# Patient Record
Sex: MALE | Race: WHITE | NOT HISPANIC OR LATINO | Employment: UNEMPLOYED | URBAN - METROPOLITAN AREA
[De-identification: names, ages, dates, MRNs, and addresses within clinical notes are randomized per-mention and may not be internally consistent; named-entity substitution may affect disease eponyms.]

---

## 2019-07-23 ENCOUNTER — OFFICE VISIT (OUTPATIENT)
Dept: URGENT CARE | Facility: PHYSICIAN GROUP | Age: 39
End: 2019-07-23

## 2019-07-23 VITALS
SYSTOLIC BLOOD PRESSURE: 112 MMHG | WEIGHT: 220 LBS | OXYGEN SATURATION: 98 % | TEMPERATURE: 98.2 F | BODY MASS INDEX: 29.8 KG/M2 | HEART RATE: 62 BPM | HEIGHT: 72 IN | DIASTOLIC BLOOD PRESSURE: 78 MMHG | RESPIRATION RATE: 16 BRPM

## 2019-07-23 DIAGNOSIS — G47.00 INSOMNIA, UNSPECIFIED TYPE: ICD-10-CM

## 2019-07-23 DIAGNOSIS — K64.4 EXTERNAL HEMORRHOID: Primary | ICD-10-CM

## 2019-07-23 PROCEDURE — 99204 OFFICE O/P NEW MOD 45 MIN: CPT | Performed by: PHYSICIAN ASSISTANT

## 2019-07-23 NOTE — PROGRESS NOTES
Subjective:      Pt is a 38 y.o. male who presents with Rectal Bleeding (x 3 months )            HPI  This is a new problem. Pt notes bright red blood on stools x 3 months without pain or other symptoms. Pt also notes insomnia issues x 1 month now and wishes to see a psychiatrist to discover triggers and notes marijuana and alcohol help him sleep. Pt has not taken any Rx medications for this condition. Pt states the pain is a 0/10. Pt denies CP, SOB, NVD, paresthesias, headaches, dizziness, change in vision, hives, or other joint pain. The pt's medication list, problem list, and allergies have been evaluated and reviewed during today's visit.    PMH:  Negative per pt.      PSH:  Negative per pt.      Fam Hx:  the patient's family history is not pertinent to their current complaint      Soc HX:  Social History     Social History   • Marital status:      Spouse name: N/A   • Number of children: N/A   • Years of education: N/A     Occupational History   • Not on file.     Social History Main Topics   • Smoking status: Current Every Day Smoker   • Smokeless tobacco: Never Used      Comment: vape   • Alcohol use 2.4 oz/week     4 Cans of beer per week      Comment: 4 nightly    • Drug use: Yes     Types: Marijuana   • Sexual activity: Not on file     Other Topics Concern   • Not on file     Social History Narrative   • No narrative on file         Medications:    Current Outpatient Prescriptions:   •  Omeprazole (PRILOSEC PO), Take  by mouth., Disp: , Rfl:   •  Lidocaine-Hydrocortisone Ace 3-0.5 % Cream, Insert 1 Application in rectum 2 Times a Day., Disp: 1 Tube, Rfl: 3      Allergies:  Patient has no known allergies.    ROS  Constitutional: Negative for fever, chills and malaise/fatigue.   HENT: Negative for congestion and sore throat.    Eyes: Negative for blurred vision, double vision and photophobia.   Respiratory: Negative for cough and shortness of breath.  Cardiovascular: Negative for chest pain and  palpitations.   Gastrointestinal: Negative for heartburn, nausea, vomiting, abdominal pain, diarrhea and constipation.   Genitourinary: Negative for dysuria and flank pain. +blood in stools  Musculoskeletal: Negative for joint pain and myalgias.   Skin: Negative for itching and rash.   Neurological: Negative for dizziness, tingling and headaches.   Endo/Heme/Allergies: Does not bruise/bleed easily.   Psychiatric/Behavioral: +insomnia         Objective:     /78 (BP Location: Left arm, Patient Position: Sitting, BP Cuff Size: Adult)   Pulse 62   Temp 36.8 °C (98.2 °F) (Temporal)   Resp 16   Ht 1.829 m (6')   Wt 99.8 kg (220 lb)   SpO2 98%   BMI 29.84 kg/m²      Physical Exam   Genitourinary: Rectal exam shows external hemorrhoid, tenderness and guaiac positive stool. Rectal exam shows no internal hemorrhoid, no fissure, no mass and anal tone normal.                Constitutional: PT is oriented to person, place, and time. PT appears well-developed and well-nourished. No distress.   HENT:   Head: Normocephalic and atraumatic.   Mouth/Throat: Oropharynx is clear and moist. No oropharyngeal exudate.   Eyes: Conjunctivae normal and EOM are normal. Pupils are equal, round, and reactive to light.   Neck: Normal range of motion. Neck supple. No thyromegaly present.   Cardiovascular: Normal rate, regular rhythm, normal heart sounds and intact distal pulses.  Exam reveals no gallop and no friction rub.    No murmur heard.  Pulmonary/Chest: Effort normal and breath sounds normal. No respiratory distress. PT has no wheezes. PT has no rales. Pt exhibits no tenderness.   Abdominal: Soft. Bowel sounds are normal. PT exhibits no distension and no mass. There is no tenderness. There is no rebound and no guarding.   Musculoskeletal: Normal range of motion. PT exhibits no edema and no tenderness.   Neurological: PT is alert and oriented to person, place, and time. PT has normal reflexes. No cranial nerve deficit.   Skin:  Skin is warm and dry. No rash noted. PT is not diaphoretic. No erythema.       Psychiatric: PT has a normal mood and affect. PT behavior is normal. Judgment and thought content normal.        Assessment/Plan:     1. External hemorrhoid    - Lidocaine-Hydrocortisone Ace 3-0.5 % Cream; Insert 1 Application in rectum 2 Times a Day.  Dispense: 1 Tube; Refill: 3  - REFERRAL TO GASTROENTEROLOGY    2. Insomnia, unspecified type    - REFERRAL TO PSYCHIATRY    STRICT ER precautions given  Rest, fluids encouraged.  Optifiber or Benefiber encouraged OTC  AVS with medical info given.  Pt was in full understanding and agreement with the plan.  Differential diagnosis, natural history, supportive care, and indications for immediate follow-up discussed. All questions answered. Patient agrees with the plan of care.  Follow-up as needed if symptoms worsen or fail to improve.

## 2020-01-13 ENCOUNTER — OFFICE VISIT (OUTPATIENT)
Dept: URGENT CARE | Facility: PHYSICIAN GROUP | Age: 40
End: 2020-01-13

## 2020-01-13 ENCOUNTER — HOSPITAL ENCOUNTER (OUTPATIENT)
Dept: RADIOLOGY | Facility: MEDICAL CENTER | Age: 40
End: 2020-01-13
Attending: NURSE PRACTITIONER

## 2020-01-13 VITALS
HEART RATE: 89 BPM | TEMPERATURE: 97 F | BODY MASS INDEX: 33.32 KG/M2 | RESPIRATION RATE: 18 BRPM | DIASTOLIC BLOOD PRESSURE: 74 MMHG | WEIGHT: 238 LBS | HEIGHT: 71 IN | SYSTOLIC BLOOD PRESSURE: 104 MMHG | OXYGEN SATURATION: 98 %

## 2020-01-13 DIAGNOSIS — N45.1 EPIDIDYMITIS: ICD-10-CM

## 2020-01-13 DIAGNOSIS — N50.811 TESTICULAR PAIN, RIGHT: ICD-10-CM

## 2020-01-13 PROCEDURE — 99214 OFFICE O/P EST MOD 30 MIN: CPT | Performed by: NURSE PRACTITIONER

## 2020-01-13 PROCEDURE — 76870 US EXAM SCROTUM: CPT

## 2020-01-13 RX ORDER — IBUPROFEN 200 MG
200 TABLET ORAL EVERY 6 HOURS PRN
COMMUNITY

## 2020-01-13 RX ORDER — SULFAMETHOXAZOLE AND TRIMETHOPRIM 800; 160 MG/1; MG/1
1 TABLET ORAL 2 TIMES DAILY
Qty: 20 TAB | Refills: 0 | Status: SHIPPED | OUTPATIENT
Start: 2020-01-13 | End: 2020-01-23

## 2020-01-13 SDOH — HEALTH STABILITY: MENTAL HEALTH: HOW OFTEN DO YOU HAVE A DRINK CONTAINING ALCOHOL?: 2-3 TIMES A WEEK

## 2020-01-13 ASSESSMENT — ENCOUNTER SYMPTOMS
AFFECTED TESTICLE: 1
FEVER: 0
FLANK PAIN: 0
CHILLS: 0

## 2020-01-13 NOTE — PROGRESS NOTES
Subjective:      Landry Vernon is a 39 y.o. male who presents with Testicle Pain ([id number] )    History reviewed. No pertinent past medical history.  Social History     Socioeconomic History   • Marital status:      Spouse name: Not on file   • Number of children: Not on file   • Years of education: Not on file   • Highest education level: Not on file   Occupational History   • Not on file   Social Needs   • Financial resource strain: Not on file   • Food insecurity:     Worry: Not on file     Inability: Not on file   • Transportation needs:     Medical: Not on file     Non-medical: Not on file   Tobacco Use   • Smoking status: Former Smoker     Packs/day: 0.00   • Smokeless tobacco: Never Used   • Tobacco comment: vape   Substance and Sexual Activity   • Alcohol use: Yes     Alcohol/week: 2.4 oz     Types: 4 Cans of beer per week     Frequency: 2-3 times a week     Comment: 4 nightly    • Drug use: Yes     Types: Marijuana   • Sexual activity: Not on file   Lifestyle   • Physical activity:     Days per week: Not on file     Minutes per session: Not on file   • Stress: Not on file   Relationships   • Social connections:     Talks on phone: Not on file     Gets together: Not on file     Attends Yazdanism service: Not on file     Active member of club or organization: Not on file     Attends meetings of clubs or organizations: Not on file     Relationship status: Not on file   • Intimate partner violence:     Fear of current or ex partner: Not on file     Emotionally abused: Not on file     Physically abused: Not on file     Forced sexual activity: Not on file   Other Topics Concern   • Not on file   Social History Narrative   • Not on file     No family history on file.    Allergies: Patient has no known allergies.    Patient is a 39-year-old male who presents today with complaint of acute onset of right testicular pain over the last 48 hours.  No injury.  States he got up very early 2 mornings ago  "to go to the bathroom and upon returning to bed noticed that he was having testicular pain on the right side.  Pain waxed and waned through the weekend though never completely resolved.  He did take ibuprofen with mild to moderate relief of symptoms.  States this morning he woke up and the pain was much worse.  He did have nausea this morning but no vomiting.  Denies fever.  Denies urgency frequency or dysuria.  No urethral discharge.  No lower back pain.          Testicle Pain   This is a new problem. The current episode started in the past 7 days. The problem occurs constantly. The problem has been unchanged. Pertinent negatives include no chills or fever. Nothing aggravates the symptoms. He has tried nothing for the symptoms. The treatment provided no relief.       Review of Systems   Constitutional: Negative for chills and fever.   Genitourinary: Negative for dysuria, flank pain, frequency, hematuria and urgency.        Right testicular pain   All other systems reviewed and are negative.         Objective:     /74   Pulse 89   Temp 36.1 °C (97 °F) (Temporal)   Resp 18   Ht 1.803 m (5' 11\")   Wt 108 kg (238 lb)   SpO2 98%   BMI 33.19 kg/m²      Physical Exam  Vitals signs reviewed.   Constitutional:       Appearance: He is not ill-appearing, toxic-appearing or diaphoretic.   Abdominal:      Tenderness: There is no tenderness. There is no right CVA tenderness, left CVA tenderness, guarding or rebound.   Genitourinary:     Penis: Normal.       Comments: Generalized pain, mild inflammation noted over the right testicle.  No swelling or pain in the suprapubic area or groin.  No CVA tenderness.  No urethral discharge.  Neurological:      Mental Status: He is alert.       US scrotum/contents: Inflammatory changes in the right testicle noted consistent with epididymitis.          Assessment/Plan:       1. Testicular pain, right  2.  Epididymitis    - [id number]; Future  -Bactrim DS  -Strict ER " precautions for persistent or worsening of symptoms

## 2021-01-08 ENCOUNTER — OFFICE VISIT (OUTPATIENT)
Dept: URGENT CARE | Facility: PHYSICIAN GROUP | Age: 41
End: 2021-01-08

## 2021-01-08 VITALS
HEIGHT: 71 IN | TEMPERATURE: 97.8 F | OXYGEN SATURATION: 98 % | BODY MASS INDEX: 33.6 KG/M2 | DIASTOLIC BLOOD PRESSURE: 90 MMHG | WEIGHT: 240 LBS | RESPIRATION RATE: 16 BRPM | HEART RATE: 103 BPM | SYSTOLIC BLOOD PRESSURE: 118 MMHG

## 2021-01-08 DIAGNOSIS — F41.9 ANXIETY: ICD-10-CM

## 2021-01-08 DIAGNOSIS — F10.10 ALCOHOL ABUSE: ICD-10-CM

## 2021-01-08 DIAGNOSIS — K29.00 ACUTE GASTRITIS WITHOUT HEMORRHAGE, UNSPECIFIED GASTRITIS TYPE: ICD-10-CM

## 2021-01-08 PROCEDURE — 99214 OFFICE O/P EST MOD 30 MIN: CPT | Performed by: PHYSICIAN ASSISTANT

## 2021-01-08 RX ORDER — HYDROXYZINE PAMOATE 25 MG/1
50 CAPSULE ORAL 2 TIMES DAILY PRN
Qty: 30 CAP | Refills: 0 | Status: SHIPPED | OUTPATIENT
Start: 2021-01-08

## 2021-01-08 ASSESSMENT — ENCOUNTER SYMPTOMS
NAUSEA: 1
HEARTBURN: 1
WHEEZING: 0
ABDOMINAL PAIN: 1
INSOMNIA: 1
PALPITATIONS: 0
HEADACHES: 0
VOMITING: 0
NERVOUS/ANXIOUS: 1
SHORTNESS OF BREATH: 0
DIZZINESS: 0
DIARRHEA: 0

## 2021-01-09 NOTE — PROGRESS NOTES
"Subjective:      Landry Vernon is a 40 y.o. male who presents with Anxiety (stomach feels tight, x3 days)            Patient is a 40-year-old male who presents to urgent care with worsening anxiety over the last 2 weeks.  Patient is with his wife today who also provides history.  Patient reports 2 weeks ago his mother  unexpectedly.  He always has had anxiety of which has typically been controlled with supplements i.e. Damaris's wort.  He admits that he has had some control of his anxiety until 2 weeks ago when it profoundly worsened.  He is requesting possible counseling and medication today.  He does report that he has been drinking daily for several years or so the last 2 weeks upwards of \"8 tall cans \"1 beer daily.  He does feel \"panicky\", decrease sleep, and restless the majority of the day.  He does report over the last 3 days upon awakening in the morning he feels as though his stomach is \"turning \"or squeezing and will last for a few seconds and relax.  Once this relaxes he feels \"like crap \"almost as if he will vomit.  He denies any vomiting, diarrhea.  He has not had an episode since a nap earlier today and \"feels fine \"at this time.  Currently denies any pain.  He denies any shortness of breath or chest pain.    Anxiety  Presents for initial visit. Onset was 1 to 5 years ago. The problem has been gradually worsening. Symptoms include insomnia, nausea and nervous/anxious behavior. Patient reports no chest pain, dizziness, palpitations or shortness of breath.     Risk factors include alcohol intake.       Review of Systems   Respiratory: Negative for shortness of breath and wheezing.    Cardiovascular: Negative for chest pain and palpitations.   Gastrointestinal: Positive for abdominal pain, heartburn and nausea. Negative for diarrhea and vomiting.   Neurological: Negative for dizziness and headaches.   Psychiatric/Behavioral: The patient is nervous/anxious and has insomnia.    All other systems " "reviewed and are negative.         Objective:     /90   Pulse (!) 103   Temp 36.6 °C (97.8 °F) (Temporal)   Resp 16   Ht 1.803 m (5' 11\")   Wt 108.9 kg (240 lb)   SpO2 98%   BMI 33.47 kg/m²      Physical Exam  Vitals signs reviewed.   Constitutional:       General: He is not in acute distress.     Appearance: He is well-developed.   HENT:      Head: Normocephalic and atraumatic.      Right Ear: External ear normal.      Left Ear: External ear normal.      Mouth/Throat:      Pharynx: No oropharyngeal exudate.   Eyes:      Conjunctiva/sclera: Conjunctivae normal.      Pupils: Pupils are equal, round, and reactive to light.   Neck:      Musculoskeletal: Normal range of motion and neck supple.      Trachea: No tracheal deviation.   Cardiovascular:      Rate and Rhythm: Normal rate and regular rhythm.      Heart sounds: No murmur.   Pulmonary:      Effort: Pulmonary effort is normal. No respiratory distress.      Breath sounds: Normal breath sounds.   Abdominal:      General: Bowel sounds are normal.      Palpations: Abdomen is soft.      Tenderness: There is no abdominal tenderness.   Musculoskeletal: Normal range of motion.   Skin:     General: Skin is warm.      Findings: No rash.   Neurological:      Mental Status: He is alert and oriented to person, place, and time.      Coordination: Coordination normal.   Psychiatric:         Behavior: Behavior normal.         Thought Content: Thought content normal.         Judgment: Judgment normal.                 Assessment/Plan:        1. Anxiety  - REFERRAL TO BEHAVIORAL HEALTH    2. Acute gastritis without hemorrhage, unspecified gastritis type  - REFERRAL TO BEHAVIORAL HEALTH    3. Alcohol abuse    Patient with completely benign exam today.  Abdomen is nontender unable to elicit the discomfort.  Discussed complications of continued daily drinking.  Patient does have history of gastritis with reflux currently taking omeprazole of which noted important to change " his diet, decrease sodium due to somewhat elevated diastolic blood pressure.  We will start the patient on hydroxyzine and will make a behavioral health referral.  Very strict precautions were discussed that would indicate pancreatitis, ischemic bowel, gallstones etc.  Patient currently stable without SI or HI.  He is waiting for his insurance to start to begin the process of getting a primary care provider.  Patient and/or guardian given precautionary s/sx that mandate immediate follow up and evaluation in the ED. Advised of risks of not doing so.  Side effects of OTC or prescribed medications discussed.   DDX, Supportive care, and indications for immediate follow-up discussed with patient.    Instructed to return to clinic or nearest emergency department if we are not available for any change in condition, further concerns, or worsening of symptoms.    The patient and/or guardian demonstrated a good understanding and agreed with the treatment plan.    Please note that this dictation was created using voice recognition software. I have made every reasonable attempt to correct obvious errors, but I expect that there are errors of grammar and possibly content that I did not discover before finalizing the note.

## 2021-08-11 ENCOUNTER — OFFICE VISIT (OUTPATIENT)
Dept: URGENT CARE | Facility: PHYSICIAN GROUP | Age: 41
End: 2021-08-11

## 2021-08-11 VITALS
HEART RATE: 77 BPM | OXYGEN SATURATION: 95 % | SYSTOLIC BLOOD PRESSURE: 122 MMHG | WEIGHT: 242 LBS | BODY MASS INDEX: 32.78 KG/M2 | TEMPERATURE: 97.9 F | RESPIRATION RATE: 16 BRPM | HEIGHT: 72 IN | DIASTOLIC BLOOD PRESSURE: 76 MMHG

## 2021-08-11 DIAGNOSIS — R10.13 EPIGASTRIC PAIN: ICD-10-CM

## 2021-08-11 PROCEDURE — 99214 OFFICE O/P EST MOD 30 MIN: CPT | Performed by: PHYSICIAN ASSISTANT

## 2021-08-11 RX ORDER — OMEPRAZOLE 20 MG/1
20 CAPSULE, DELAYED RELEASE ORAL
COMMUNITY

## 2021-08-11 RX ORDER — SUCRALFATE 1 G/1
1 TABLET ORAL
Qty: 120 TABLET | Refills: 3 | Status: SHIPPED | OUTPATIENT
Start: 2021-08-11

## 2021-08-11 ASSESSMENT — ENCOUNTER SYMPTOMS
HEARTBURN: 1
SHORTNESS OF BREATH: 0
COUGH: 0
MYALGIAS: 0
ABDOMINAL PAIN: 1
CONSTIPATION: 0
BLOOD IN STOOL: 0
NAUSEA: 0
HEADACHES: 0
FEVER: 0
DIARRHEA: 0
BACK PAIN: 0
WEAKNESS: 0
FLANK PAIN: 0
VOMITING: 0
CHILLS: 0
PALPITATIONS: 0
DIZZINESS: 0
DIAPHORESIS: 0
WEIGHT LOSS: 0
ROS GI COMMENTS: SOFT STOOL

## 2021-08-11 NOTE — PROGRESS NOTES
Subjective:   Landry Vernon is a 40 y.o. male who presents for GI Problem (x3 day, pain in the upper middle quadrant of stomach, soft stool )      HPI:  This is a very pleasant 40-year-old male presenting to the clinic with abdominal pain x3 days.  The pain is located in his epigastric region.  Described as a constant dull ache.  At rest the pain is a 3/10.  With certain movements such as lumbar rotation or bouncing movements such as ascending and descending stairs his pain greatly increases.  The pain does not radiate.  Pain does not seem to be aggravated after meals.  No associated shortness of breath or chest pain.  He has had soft stools over the last 3 days but denies any blood or mucus in the stool.  Denies any black tarry stool.  Does not feel nauseous has not had any episodes of vomiting.  He does have a history of GERD and gastritis.  Takes omeprazole daily.  He does drink daily states he drinks 1-2 beers per night.  No new medications.  Has not been taking increased amount of anti-inflammatories.  Denies any fevers, chills or myalgias.    Review of Systems   Constitutional: Negative for chills, diaphoresis, fever, malaise/fatigue and weight loss.   Respiratory: Negative for cough and shortness of breath.    Cardiovascular: Negative for chest pain and palpitations.   Gastrointestinal: Positive for abdominal pain and heartburn. Negative for blood in stool, constipation, diarrhea, melena, nausea and vomiting.        Soft stool   Genitourinary: Negative for dysuria, flank pain, frequency, hematuria and urgency.   Musculoskeletal: Negative for back pain and myalgias.   Skin: Negative for rash.   Neurological: Negative for dizziness, weakness and headaches.       Medications:    • hydrOXYzine pamoate Caps  • ibuprofen Tabs  • omeprazole  • PRILOSEC PO    Allergies: Patient has no known allergies.    Problem List: Landry Vernon does not have a problem list on file.    Surgical History:  No past surgical  "history on file.    Past Social Hx: Landry Vernon  reports that he has quit smoking. He smoked 0.00 packs per day. He has never used smokeless tobacco. He reports current alcohol use of about 2.4 oz of alcohol per week. He reports current drug use. Drug: Marijuana.     Past Family Hx:  Landry Vernon family history is not on file.     Problem list, medications, and allergies reviewed by myself today in Epic.     Objective:     /76   Pulse 77   Temp 36.6 °C (97.9 °F) (Temporal)   Resp 16   Ht 1.816 m (5' 11.5\")   Wt 110 kg (242 lb)   SpO2 95%   BMI 33.28 kg/m²     Physical Exam  Constitutional:       General: He is not in acute distress.     Appearance: Normal appearance. He is not ill-appearing, toxic-appearing or diaphoretic.   HENT:      Head: Normocephalic and atraumatic.   Eyes:      Conjunctiva/sclera: Conjunctivae normal.   Cardiovascular:      Rate and Rhythm: Normal rate and regular rhythm.      Pulses: Normal pulses.      Heart sounds: Normal heart sounds.   Pulmonary:      Effort: Pulmonary effort is normal.      Breath sounds: Normal breath sounds. No wheezing.   Abdominal:      General: Bowel sounds are normal. There is no distension.      Palpations: Abdomen is soft. There is no mass.      Tenderness: There is abdominal tenderness in the right upper quadrant, epigastric area and left upper quadrant. There is no right CVA tenderness, left CVA tenderness, guarding or rebound.      Hernia: No hernia is present.          Comments: Patient has mild tenderness to deep palpation to the right upper quadrant, epigastric and left upper quadrant region.  Negative Zhao sign on exam.  Pain is also reproduced with lumbar rotation.   Musculoskeletal:      Cervical back: Normal range of motion. No muscular tenderness.   Lymphadenopathy:      Cervical: No cervical adenopathy.   Skin:     General: Skin is warm and dry.      Capillary Refill: Capillary refill takes less than 2 seconds. "   Neurological:      General: No focal deficit present.      Mental Status: He is alert and oriented to person, place, and time. Mental status is at baseline.   Psychiatric:         Mood and Affect: Mood normal.         Thought Content: Thought content normal.           Assessment/Plan:     Comments/MDM:     • Differential diagnoses and treatment options were discussed with the patient at length today.  Differentials at this time include gastritis versus PUD versus pancreatitis versus cholelithiasis versus abdominal muscle strain.  Patient had very minimal tenderness to palpation to the epigastric region on exam.  Pain was most obvious after lumbar rotation and lumbar flexion.  Negative Zhao sign.  Vital signs stable and within normal limits.  Tolerating oral intake without complication.  We did perform a GI cocktail in clinic which provided moderate improvement.  Discussed potentially obtaining labs and outpatient imaging.  The patient is self-pay and would like to limit expenses at this time.  Due to his symptoms and physical exam findings I believe this is reasonable.  Strict ER precautions were provided for any worsening symptoms.  Diet recommendations discussed.  Limit alcohol intake.  Continue to take omeprazole as prescribed.  Call with questions or concerns.     Diagnosis and associated orders:     1. Epigastric pain    - sucralfate (CARAFATE) 1 GM Tab; Take 1 Tablet by mouth 4 Times a Day,Before Meals and at Bedtime.  Dispense: 120 Tablet; Refill: 3           Differential diagnosis, natural history, supportive care, and indications for immediate follow-up discussed.    Advised the patient to follow-up with the primary care physician for recheck, reevaluation, and consideration of further management.    Please note that this dictation was created using voice recognition software. I have made reasonable attempt to correct obvious errors, but I expect that there are errors of grammar and possibly content  that I did not discover before finalizing the note.    This note was electronically signed by CHANDRAKANT Proctor PA-C

## 2021-09-15 ENCOUNTER — HOSPITAL ENCOUNTER (OUTPATIENT)
Dept: RADIOLOGY | Facility: MEDICAL CENTER | Age: 41
End: 2021-09-15
Attending: PHYSICIAN ASSISTANT

## 2021-09-15 ENCOUNTER — OFFICE VISIT (OUTPATIENT)
Dept: URGENT CARE | Facility: PHYSICIAN GROUP | Age: 41
End: 2021-09-15

## 2021-09-15 VITALS
OXYGEN SATURATION: 98 % | WEIGHT: 246 LBS | SYSTOLIC BLOOD PRESSURE: 130 MMHG | TEMPERATURE: 97.7 F | RESPIRATION RATE: 16 BRPM | DIASTOLIC BLOOD PRESSURE: 82 MMHG | HEIGHT: 72 IN | BODY MASS INDEX: 33.32 KG/M2 | HEART RATE: 61 BPM

## 2021-09-15 DIAGNOSIS — S86.912A KNEE STRAIN, LEFT, INITIAL ENCOUNTER: ICD-10-CM

## 2021-09-15 PROCEDURE — 73564 X-RAY EXAM KNEE 4 OR MORE: CPT | Mod: LT

## 2021-09-15 PROCEDURE — 99214 OFFICE O/P EST MOD 30 MIN: CPT | Performed by: PHYSICIAN ASSISTANT

## 2021-09-15 RX ORDER — NAPROXEN 500 MG/1
500 TABLET ORAL 2 TIMES DAILY WITH MEALS
Qty: 60 TABLET | Refills: 1 | Status: SHIPPED | OUTPATIENT
Start: 2021-09-15

## 2021-09-15 ASSESSMENT — PAIN SCALES - GENERAL: PAINLEVEL: 10=SEVERE PAIN

## 2021-09-15 ASSESSMENT — ENCOUNTER SYMPTOMS
VOMITING: 0
CHILLS: 0
TINGLING: 0
FOCAL WEAKNESS: 0
NAUSEA: 0
FEVER: 0
SENSORY CHANGE: 0

## 2021-09-16 ENCOUNTER — TELEPHONE (OUTPATIENT)
Dept: URGENT CARE | Facility: PHYSICIAN GROUP | Age: 41
End: 2021-09-16

## 2021-09-16 NOTE — PROGRESS NOTES
"Subjective:   Landry Vernon  is a 40 y.o. male who presents for Knee Pain (left, constant pain, no injury, walking and laying down makes it worst, x5 days)      Knee Pain  This is a new problem. The current episode started in the past 7 days (5d). Pertinent negatives include no chills, fever, nausea, rash or vomiting.   Patient presents urgent care complaining of pain to left knee times last 5 days.  He notes some history of similar pain on inner side of left knee for months but much worse over the last 5 days.  He did complains of sharp pain and slight catching sensation.  He denies swelling.  He notes some instability.  He denies memorable traumatic injury.  He states he does work as a  which involves coming up and down off his machine frequently.  He denies history of surgery or significant injury to left knee.  He has tried anti-inflammatory medicine as well as taping with minimal but some improvement.  He has tried a neoprene brace with minimal improvement.  He complains of difficulty finding position of comfort at night while sleeping.    Review of Systems   Constitutional: Negative for chills and fever.   Gastrointestinal: Negative for nausea and vomiting.   Musculoskeletal: Positive for joint pain ( left knee).   Skin: Negative for rash.   Neurological: Negative for tingling, sensory change and focal weakness.       No Known Allergies     Objective:   /82   Pulse 61   Temp 36.5 °C (97.7 °F) (Temporal)   Resp 16   Ht 1.816 m (5' 11.5\")   Wt 112 kg (246 lb)   SpO2 98%   BMI 33.83 kg/m²     Physical Exam  Vitals and nursing note reviewed.   Constitutional:       General: He is not in acute distress.     Appearance: He is well-developed. He is not diaphoretic.   HENT:      Head: Normocephalic and atraumatic.      Right Ear: External ear normal.      Left Ear: External ear normal.      Nose: Nose normal.   Eyes:      General: No scleral icterus.        Right eye: No discharge.   "       Left eye: No discharge.      Conjunctiva/sclera: Conjunctivae normal.   Pulmonary:      Effort: Pulmonary effort is normal. No respiratory distress.   Musculoskeletal:      Cervical back: Neck supple.      Right knee: Normal pulse.      Left knee: Effusion ( trace to mild) present. No swelling, deformity, erythema, ecchymosis, lacerations or crepitus. Decreased range of motion ( pain w/ flexion and extension). Tenderness present over the medial joint line. No lateral joint line or patellar tendon tenderness. No LCL laxity, MCL laxity, ACL laxity or PCL laxity.Abnormal meniscus. Normal pulse.      Instability Tests: Anterior drawer test negative. Posterior drawer test negative. Medial Lory test positive. Lateral Lory test negative.   Skin:     General: Skin is warm and dry.      Coloration: Skin is not pale.   Neurological:      Mental Status: He is alert and oriented to person, place, and time.      Coordination: Coordination normal.     DX KNEE -   IMPRESSION:     1.  Soft tissue ossification/calcification along the medial femoral epicondyle suggesting old MCL injury.     2.  Posterior patellar spurring.              INTERPRETING LOCATION:  [address] [place] [address], [id number]        Exam Ended: 09/15/21  6:53 PM Last Resulted: 09/15/21  6:58 PM            Assessment/Plan:   1. Knee strain, left, initial encounter  - DX-KNEE COMPLETE 4+ LEFT; Future  Recommend conservative care, rest, ice, elevation, work on gentle ROM exercises/wall slides, Rx naproxen, no other nsaids while taking, f/u w/ orthopedics  Return to clinic with lack of resolution or progression of symptoms.    I have worn an N95 mask, gloves and eye protection for the entire encounter with this patient.     Differential diagnosis, natural history, supportive care, and indications for immediate follow-up discussed.    My total time spent caring for the patient on the day of the encounter was 30 minutes.   This does not include time spent on  separately billable procedures/tests.

## 2021-11-16 ENCOUNTER — NON-PROVIDER VISIT (OUTPATIENT)
Dept: URGENT CARE | Facility: PHYSICIAN GROUP | Age: 41
End: 2021-11-16

## 2021-11-16 DIAGNOSIS — Z02.1 PRE-EMPLOYMENT DRUG SCREENING: ICD-10-CM

## 2021-11-16 LAB
AMP AMPHETAMINE: NORMAL
COC COCAINE: NORMAL
INT CON NEG: NORMAL
INT CON POS: NORMAL
MET METHAMPHETAMINES: NORMAL
OPI OPIATES: NORMAL
PCP PHENCYCLIDINE: NORMAL
POC DRUG COMMENT 753798-OCCUPATIONAL HEALTH: NEGATIVE
THC: NORMAL

## 2021-11-16 PROCEDURE — 80305 DRUG TEST PRSMV DIR OPT OBS: CPT | Performed by: PHYSICIAN ASSISTANT

## 2022-03-25 ENCOUNTER — OFFICE VISIT (OUTPATIENT)
Dept: URGENT CARE | Facility: PHYSICIAN GROUP | Age: 42
End: 2022-03-25
Payer: COMMERCIAL

## 2022-03-25 VITALS
DIASTOLIC BLOOD PRESSURE: 82 MMHG | TEMPERATURE: 97.2 F | HEIGHT: 72 IN | OXYGEN SATURATION: 97 % | SYSTOLIC BLOOD PRESSURE: 118 MMHG | BODY MASS INDEX: 32.51 KG/M2 | RESPIRATION RATE: 19 BRPM | WEIGHT: 240 LBS | HEART RATE: 64 BPM

## 2022-03-25 DIAGNOSIS — M10.9 ACUTE GOUT INVOLVING TOE OF LEFT FOOT, UNSPECIFIED CAUSE: ICD-10-CM

## 2022-03-25 PROBLEM — K21.9 GASTROESOPHAGEAL REFLUX DISEASE: Status: ACTIVE | Noted: 2017-08-02

## 2022-03-25 PROCEDURE — 99213 OFFICE O/P EST LOW 20 MIN: CPT | Performed by: FAMILY MEDICINE

## 2022-03-25 RX ORDER — CHLORAL HYDRATE 500 MG
1 CAPSULE ORAL DAILY
COMMUNITY

## 2022-03-25 RX ORDER — INDOMETHACIN 50 MG/1
50 CAPSULE ORAL 3 TIMES DAILY
Qty: 30 CAPSULE | Refills: 0 | Status: SHIPPED | OUTPATIENT
Start: 2022-03-25

## 2022-03-25 RX ORDER — ST. JOHN'S WORT 300 MG
2 CAPSULE ORAL DAILY
COMMUNITY

## 2022-03-25 NOTE — PROGRESS NOTES
"Subjective:      Chief Complaint   Patient presents with   • Toe Pain     Swollen, warm to the touch, started Monday.             Foot Problem      C/o constant, throbbing  toe pain x 3 d worse with walking.   Denies any injury.   Pt has no prior hx  of gout        Pertinent negatives include no abdominal pain, chest pain, fever or headaches.       Social History     Tobacco Use   • Smoking status: Former Smoker     Packs/day: 0.00   • Smokeless tobacco: Never Used   • Tobacco comment: vape   Vaping Use   • Vaping Use: Every day   Substance Use Topics   • Alcohol use: Yes     Alcohol/week: 2.4 oz     Types: 4 Cans of beer per week     Comment: 4 nightly    • Drug use: Yes     Types: Marijuana           Current Outpatient Medications on File Prior to Visit   Medication Sig Dispense Refill   • naproxen (NAPROSYN) 500 MG Tab Take 1 Tablet by mouth 2 times a day with meals. 60 Tablet 1   • omeprazole (PRILOSEC) 20 MG delayed-release capsule Take 20 mg by mouth.     • ibuprofen (MOTRIN) 200 MG Tab Take 200 mg by mouth every 6 hours as needed.     • Omeprazole (PRILOSEC PO) Take  by mouth.     • sucralfate (CARAFATE) 1 GM Tab Take 1 Tablet by mouth 4 Times a Day,Before Meals and at Bedtime. (Patient not taking: Reported on 3/25/2022) 120 Tablet 3   • hydrOXYzine pamoate (VISTARIL) 25 MG Cap Take 2 Caps by mouth 2 times a day as needed for Anxiety or Other (May cause sedation). (Patient not taking: Reported on 3/25/2022) 30 Cap 0     No current facility-administered medications on file prior to visit.       No past medical history on file.      Review of Systems   Constitutional: Negative for fever.   Cardiovascular: Negative for chest pain.   Gastrointestinal: Negative for abdominal pain.   Neurological: Negative for headaches.   All other systems reviewed and are negative.         Objective:     /82   Pulse 64   Temp 36.2 °C (97.2 °F)   Resp 19   Ht 1.816 m (5' 11.5\")   Wt 109 kg (240 lb)   SpO2 97% "     Physical Exam   Constitutional: She is oriented to person, place, and time. She appears well-developed and well-nourished. No distress.   HENT:   Head: Normocephalic and atraumatic.   Eyes: Conjunctivae are normal.   Cardiovascular: Normal rate, regular rhythm and normal heart sounds.    Pulmonary/Chest: Effort normal and breath sounds normal. No respiratory distress.   Musculoskeletal:   There is erythema, swelling and tenderness at  left great toe   Neurological: She is alert and oriented to person, place, and time.   Skin: Skin is warm. She is not diaphoretic. No erythema.   Nursing note and vitals reviewed.              Assessment/Plan:     1. Acute gout involving toe of left foot, unspecified cause     - indomethacin (INDOCIN) 50 MG Cap; Take 1 Capsule by mouth 3 times a day.  Dispense: 30 Capsule; Refill: 0    Advised f/u PCP

## 2023-06-02 ENCOUNTER — OFFICE VISIT (OUTPATIENT)
Dept: URGENT CARE | Facility: PHYSICIAN GROUP | Age: 43
End: 2023-06-02

## 2023-06-02 ENCOUNTER — HOSPITAL ENCOUNTER (OUTPATIENT)
Dept: LAB | Facility: MEDICAL CENTER | Age: 43
End: 2023-06-02

## 2023-06-02 VITALS
SYSTOLIC BLOOD PRESSURE: 138 MMHG | OXYGEN SATURATION: 97 % | HEIGHT: 72 IN | HEART RATE: 77 BPM | RESPIRATION RATE: 12 BRPM | TEMPERATURE: 97.9 F | BODY MASS INDEX: 31.67 KG/M2 | DIASTOLIC BLOOD PRESSURE: 80 MMHG | WEIGHT: 233.8 LBS

## 2023-06-02 DIAGNOSIS — I10 HYPERTENSION, UNSPECIFIED TYPE: ICD-10-CM

## 2023-06-02 LAB
ALBUMIN SERPL BCP-MCNC: 4 G/DL (ref 3.2–4.9)
BUN SERPL-MCNC: 11 MG/DL (ref 8–22)
CALCIUM ALBUM COR SERPL-MCNC: 9.3 MG/DL (ref 8.5–10.5)
CALCIUM SERPL-MCNC: 9.3 MG/DL (ref 8.5–10.5)
CHLORIDE SERPL-SCNC: 105 MMOL/L (ref 96–112)
CO2 SERPL-SCNC: 25 MMOL/L (ref 20–33)
CREAT SERPL-MCNC: 0.81 MG/DL (ref 0.5–1.4)
GFR SERPLBLD CREATININE-BSD FMLA CKD-EPI: 112 ML/MIN/1.73 M 2
GLUCOSE SERPL-MCNC: 88 MG/DL (ref 65–99)
PHOSPHATE SERPL-MCNC: 3.2 MG/DL (ref 2.5–4.5)
POTASSIUM SERPL-SCNC: 4.3 MMOL/L (ref 3.6–5.5)
SODIUM SERPL-SCNC: 140 MMOL/L (ref 135–145)

## 2023-06-02 PROCEDURE — 36415 COLL VENOUS BLD VENIPUNCTURE: CPT

## 2023-06-02 PROCEDURE — 3079F DIAST BP 80-89 MM HG: CPT

## 2023-06-02 PROCEDURE — 3075F SYST BP GE 130 - 139MM HG: CPT

## 2023-06-02 PROCEDURE — 99213 OFFICE O/P EST LOW 20 MIN: CPT

## 2023-06-02 PROCEDURE — 80069 RENAL FUNCTION PANEL: CPT

## 2023-06-02 RX ORDER — AMOXICILLIN 875 MG/1
875 TABLET, COATED ORAL
COMMUNITY
Start: 2023-05-31 | End: 2023-06-07

## 2023-06-02 RX ORDER — OXYCODONE AND ACETAMINOPHEN 10; 325 MG/1; MG/1
1 TABLET ORAL
COMMUNITY
Start: 2023-05-31 | End: 2023-06-05

## 2023-06-02 NOTE — PROGRESS NOTES
Subjective:   Landry Vernon is a 42 y.o. male who presents for Blood Pressure Problem (Today as been getting crazy high numbers patient states the highest was 195/122)      HPI:    Patient presents presents urgent care with his significant other with concerns of elevated blood pressure readings.  Patient states he has been monitoring his blood pressure after a visit to the dentist yesterday who told him that his blood pressure was elevated.  He states his blood pressures have been ranging from 160/82 to 195/122.  He has checked blood pressure in both arms  He states he has a history of borderline elevated readings.  He has not started treatment for his elevated readings.  He is currently uninsured, and his primary care is at [facility].  Denies headache, vision disturbances, decreased urinary output.   Denies history of type 2 diabetes.   Denies chest pain, shortness of breath, dizziness, palpitations, leg swelling, cough  Denies facial asymmetry, balance/coordination changes, speech difficulties, unilateral weakness  Denies fever, chills      ROS As above in HPI    Medications:    Current Outpatient Medications on File Prior to Visit   Medication Sig Dispense Refill    amoxicillin (AMOXIL) 875 MG tablet Take 875 mg by mouth.      indomethacin (INDOCIN) 50 MG Cap Take 1 Capsule by mouth 3 times a day. 30 Capsule 0    St Johns Wort 300 MG Cap Take 2 Capsules by mouth every day.      Omega-3 1000 MG Cap Take 1 Capsule by mouth every day.      Prenatal Vit-Fe Fumarate-FA (M-VIT PO) Take 1 Tablet by mouth every day.      naproxen (NAPROSYN) 500 MG Tab Take 1 Tablet by mouth 2 times a day with meals. 60 Tablet 1    omeprazole (PRILOSEC) 20 MG delayed-release capsule Take 20 mg by mouth.      ibuprofen (MOTRIN) 200 MG Tab Take 200 mg by mouth every 6 hours as needed.      Omeprazole (PRILOSEC PO) Take  by mouth.      oxyCODONE-acetaminophen (PERCOCET-10)  MG Tab Take 1 Tablet by mouth. (Patient not taking:  "Reported on 6/2/2023)      sucralfate (CARAFATE) 1 GM Tab Take 1 Tablet by mouth 4 Times a Day,Before Meals and at Bedtime. (Patient not taking: Reported on 3/25/2022) 120 Tablet 3    hydrOXYzine pamoate (VISTARIL) 25 MG Cap Take 2 Caps by mouth 2 times a day as needed for Anxiety or Other (May cause sedation). (Patient not taking: Reported on 3/25/2022) 30 Cap 0     No current facility-administered medications on file prior to visit.        Allergies:   Patient has no known allergies.    Problem List:   Patient Active Problem List   Diagnosis    Gastroesophageal reflux disease        Surgical History:  No past surgical history on file.    Past Social Hx:   Social History     Tobacco Use    Smoking status: Former     Packs/day: 0.00     Types: Cigarettes    Smokeless tobacco: Never    Tobacco comments:     vape   Vaping Use    Vaping Use: Every day   Substance Use Topics    Alcohol use: Yes     Alcohol/week: 2.4 oz     Types: 4 Cans of beer per week     Comment: 4 nightly     Drug use: Yes     Types: Marijuana          Problem list, medications, and allergies reviewed by myself today in Epic.     Objective:     /80 (BP Location: Right arm, Patient Position: Sitting, BP Cuff Size: Adult)   Pulse 77   Temp 36.6 °C (97.9 °F) (Temporal)   Resp 12   Ht 1.816 m (5' 11.5\")   Wt 106 kg (233 lb 12.8 oz)   SpO2 97%   BMI 32.15 kg/m²     Physical Exam  Vitals and nursing note reviewed.   Constitutional:       General: He is not in acute distress.     Appearance: Normal appearance. He is not ill-appearing or diaphoretic.   HENT:      Head: Normocephalic and atraumatic.   Cardiovascular:      Rate and Rhythm: Normal rate and regular rhythm.      Heart sounds: Normal heart sounds. No murmur heard.     No friction rub. No gallop.   Pulmonary:      Effort: Pulmonary effort is normal.      Breath sounds: Normal breath sounds.   Abdominal:      General: Bowel sounds are normal.      Palpations: Abdomen is soft.   Skin:   "   General: Skin is warm and dry.      Capillary Refill: Capillary refill takes less than 2 seconds.      Findings: No rash.   Neurological:      Mental Status: He is alert and oriented to person, place, and time.         Assessment/Plan:       Diagnosis and associated orders:   1. Hypertension, unspecified type  - Renal Function Panel; Future  - losartan (COZAAR) 50 MG Tab; Take 1 Tablet by mouth every day for 30 days.  Dispense: 30 Tablet; Refill: 0       Comments/MDM:     Renal panel WNL  Will start patient on Losartan 50 mg once a day.  Patient advised to maintain record of blood pressure readings several times a day and follow-up with primary care.  If he is unable to see his primary at [facility] due to lack of insurance, he has been referred to [facility] and about hopes.   Return to ER precautions reviewed  Patient verbalized understanding and consented to plan of care       Please note that this dictation was created using voice recognition software. I have made a reasonable attempt to correct obvious errors, but I expect that there are errors of grammar and possibly content that I did not discover before finalizing the note.    This note was electronically signed by Mandy Beckman DNP

## 2023-06-03 ENCOUNTER — TELEPHONE (OUTPATIENT)
Dept: URGENT CARE | Facility: PHYSICIAN GROUP | Age: 43
End: 2023-06-03

## 2023-06-03 RX ORDER — LOSARTAN POTASSIUM 50 MG/1
50 TABLET ORAL DAILY
Qty: 30 TABLET | Refills: 0 | Status: SHIPPED | OUTPATIENT
Start: 2023-06-03 | End: 2023-07-03

## 2023-06-03 NOTE — TELEPHONE ENCOUNTER
VOICEMAIL  1. Caller Name: Landry Worthington                        Call Back Number: [phone] (home)       2. Message: Patient would like to discuss lab results when complete. Please advise.    3. Patient approves office to leave a detailed voicemail/MyChart message: yes

## 2024-07-02 ENCOUNTER — APPOINTMENT (OUTPATIENT)
Dept: MEDICAL GROUP | Facility: MEDICAL CENTER | Age: 44
End: 2024-07-02
Payer: COMMERCIAL

## 2024-07-05 ENCOUNTER — OFFICE VISIT (OUTPATIENT)
Dept: MEDICAL GROUP | Facility: MEDICAL CENTER | Age: 44
End: 2024-07-05
Payer: COMMERCIAL

## 2024-07-05 VITALS
BODY MASS INDEX: 31.43 KG/M2 | HEART RATE: 86 BPM | DIASTOLIC BLOOD PRESSURE: 86 MMHG | SYSTOLIC BLOOD PRESSURE: 122 MMHG | TEMPERATURE: 98.1 F | HEIGHT: 72 IN | RESPIRATION RATE: 16 BRPM | WEIGHT: 232.03 LBS | OXYGEN SATURATION: 97 %

## 2024-07-05 DIAGNOSIS — F32.A MILD DEPRESSION: ICD-10-CM

## 2024-07-05 DIAGNOSIS — K21.9 GASTROESOPHAGEAL REFLUX DISEASE, UNSPECIFIED WHETHER ESOPHAGITIS PRESENT: ICD-10-CM

## 2024-07-05 DIAGNOSIS — Z91.89 AT RISK FOR DEPRESSION: ICD-10-CM

## 2024-07-05 DIAGNOSIS — Z72.0 TOBACCO USE: ICD-10-CM

## 2024-07-05 DIAGNOSIS — F41.9 ANXIETY: ICD-10-CM

## 2024-07-05 DIAGNOSIS — H91.90 HEARING LOSS, UNSPECIFIED HEARING LOSS TYPE, UNSPECIFIED LATERALITY: ICD-10-CM

## 2024-07-05 DIAGNOSIS — Z76.89 ENCOUNTER TO ESTABLISH CARE: ICD-10-CM

## 2024-07-05 DIAGNOSIS — E55.9 VITAMIN D INSUFFICIENCY: ICD-10-CM

## 2024-07-05 PROCEDURE — 99214 OFFICE O/P EST MOD 30 MIN: CPT | Performed by: STUDENT IN AN ORGANIZED HEALTH CARE EDUCATION/TRAINING PROGRAM

## 2024-07-05 PROCEDURE — 3079F DIAST BP 80-89 MM HG: CPT | Performed by: STUDENT IN AN ORGANIZED HEALTH CARE EDUCATION/TRAINING PROGRAM

## 2024-07-05 PROCEDURE — 3074F SYST BP LT 130 MM HG: CPT | Performed by: STUDENT IN AN ORGANIZED HEALTH CARE EDUCATION/TRAINING PROGRAM

## 2024-07-05 ASSESSMENT — ANXIETY QUESTIONNAIRES
1. FEELING NERVOUS, ANXIOUS, OR ON EDGE: MORE THAN HALF THE DAYS
3. WORRYING TOO MUCH ABOUT DIFFERENT THINGS: MORE THAN HALF THE DAYS
5. BEING SO RESTLESS THAT IT IS HARD TO SIT STILL: NOT AT ALL
7. FEELING AFRAID AS IF SOMETHING AWFUL MIGHT HAPPEN: NEARLY EVERY DAY
6. BECOMING EASILY ANNOYED OR IRRITABLE: MORE THAN HALF THE DAYS
4. TROUBLE RELAXING: SEVERAL DAYS
2. NOT BEING ABLE TO STOP OR CONTROL WORRYING: MORE THAN HALF THE DAYS
GAD7 TOTAL SCORE: 12

## 2024-07-05 ASSESSMENT — PATIENT HEALTH QUESTIONNAIRE - PHQ9
CLINICAL INTERPRETATION OF PHQ2 SCORE: 3
SUM OF ALL RESPONSES TO PHQ QUESTIONS 1-9: 7
5. POOR APPETITE OR OVEREATING: 0 - NOT AT ALL

## 2024-07-05 ASSESSMENT — ENCOUNTER SYMPTOMS
SHORTNESS OF BREATH: 0
NAUSEA: 0
PALPITATIONS: 0
SPEECH CHANGE: 0
NERVOUS/ANXIOUS: 1
MYALGIAS: 0
CHILLS: 0
DEPRESSION: 1
VOMITING: 0
ABDOMINAL PAIN: 0
COUGH: 0
FEVER: 0
FOCAL WEAKNESS: 0
BLURRED VISION: 0

## 2024-09-12 ENCOUNTER — APPOINTMENT (OUTPATIENT)
Dept: MEDICAL GROUP | Facility: MEDICAL CENTER | Age: 44
End: 2024-09-12
Payer: COMMERCIAL

## 2024-09-26 ENCOUNTER — APPOINTMENT (OUTPATIENT)
Dept: MEDICAL GROUP | Facility: MEDICAL CENTER | Age: 44
End: 2024-09-26
Payer: COMMERCIAL

## 2024-10-17 ENCOUNTER — APPOINTMENT (OUTPATIENT)
Dept: MEDICAL GROUP | Facility: MEDICAL CENTER | Age: 44
End: 2024-10-17
Payer: COMMERCIAL

## 2025-01-07 ENCOUNTER — OFFICE VISIT (OUTPATIENT)
Dept: URGENT CARE | Facility: PHYSICIAN GROUP | Age: 45
End: 2025-01-07
Payer: COMMERCIAL

## 2025-01-07 VITALS
HEART RATE: 80 BPM | TEMPERATURE: 97.9 F | BODY MASS INDEX: 30.76 KG/M2 | WEIGHT: 227.07 LBS | DIASTOLIC BLOOD PRESSURE: 70 MMHG | OXYGEN SATURATION: 97 % | HEIGHT: 72 IN | SYSTOLIC BLOOD PRESSURE: 122 MMHG | RESPIRATION RATE: 14 BRPM

## 2025-01-07 DIAGNOSIS — M79.604 PAIN IN BOTH LOWER EXTREMITIES: ICD-10-CM

## 2025-01-07 DIAGNOSIS — M79.604 PAIN AND SWELLING OF LOWER EXTREMITY, RIGHT: ICD-10-CM

## 2025-01-07 DIAGNOSIS — M79.605 PAIN AND SWELLING OF LEFT LOWER EXTREMITY: ICD-10-CM

## 2025-01-07 DIAGNOSIS — M79.605 PAIN IN BOTH LOWER EXTREMITIES: ICD-10-CM

## 2025-01-07 DIAGNOSIS — M79.89 PAIN AND SWELLING OF LOWER EXTREMITY, RIGHT: ICD-10-CM

## 2025-01-07 DIAGNOSIS — M79.89 PAIN AND SWELLING OF LEFT LOWER EXTREMITY: ICD-10-CM

## 2025-01-07 PROCEDURE — 3074F SYST BP LT 130 MM HG: CPT

## 2025-01-07 PROCEDURE — 99214 OFFICE O/P EST MOD 30 MIN: CPT

## 2025-01-07 PROCEDURE — 3078F DIAST BP <80 MM HG: CPT

## 2025-01-07 ASSESSMENT — ENCOUNTER SYMPTOMS
FEVER: 0
CHILLS: 0

## 2025-01-07 NOTE — PROGRESS NOTES
CHIEF COMPLAINT  Chief Complaint   Patient presents with    Leg Pain     X 2 wk Leg and feet pain, pain in back of Rt lower leg     Subjective:   Landry Vernon is a 44 y.o. male who presents to urgent care with concerns for bilateral lower extremity pain.  Patient reports that symptoms of pain and swelling started in his feet ankles and calves approximately 2 weeks ago.  He has noted worsening symptoms of pain and swelling since onset.  Denies any redness or warmth.  Patient denies any history of injury or trauma.  He denies any symptoms of numbness or tingling.  No loss of sensation or strength.  He does note that pain typically worsens with walking, and is present primarily to his posterior leg.  Denies any symptoms of shortness of breath.  No chest pain.  Denies any symptoms of fever or chills.  No other pertinent history.        Review of Systems   Constitutional:  Negative for chills and fever.   Cardiovascular:  Positive for leg swelling.       PAST MEDICAL HISTORY  Patient Active Problem List    Diagnosis Date Noted    Mild depression 07/05/2024    Anxiety 07/05/2024    Tobacco use 07/05/2024    Vitamin D insufficiency 07/05/2024    Hearing loss 07/05/2024    Gastroesophageal reflux disease 08/02/2017       SURGICAL HISTORY  patient denies any surgical history    ALLERGIES  No Known Allergies    CURRENT MEDICATIONS  Home Medications       Reviewed by Luís Augustin Ass't (Medical Assistant) on 01/07/25 at 1313  Med List Status: <None>     Medication Last Dose Status   calcium polycarbophil (FIBERCON) 625 MG Tab Taking Active   hydrOXYzine pamoate (VISTARIL) 25 MG Cap Not Taking Active   ibuprofen (MOTRIN) 200 MG Tab Not Taking Active   indomethacin (INDOCIN) 50 MG Cap Not Taking Active   multivitamin Tab Taking Active   naproxen (NAPROSYN) 500 MG Tab Not Taking Active   Omega-3 1000 MG Cap Taking Active   Omeprazole (PRILOSEC PO) Taking Active   omeprazole (PRILOSEC) 20 MG delayed-release  "capsule  Active   Prenatal Vit-Fe Fumarate-FA (M-VIT PO) Not Taking Active   St Johns Wort 300 MG Cap Not Taking Active   sucralfate (CARAFATE) 1 GM Tab Not Taking Active                    SOCIAL HISTORY  Social History     Tobacco Use    Smoking status: Former     Types: Cigarettes    Smokeless tobacco: Never    Tobacco comments:     vape   Vaping Use    Vaping status: Every Day    Substances: Nicotine    Devices: Refillable tank   Substance and Sexual Activity    Alcohol use: Yes     Alcohol/week: 2.4 oz     Types: 4 Cans of beer per week     Comment: 1-4 beers a week    Drug use: Yes     Frequency: 7.0 times per week     Types: Marijuana     Comment: use nightly for sleep    Sexual activity: Not on file       FAMILY HISTORY  History reviewed. No pertinent family history.      Medications, Allergies, and current problem list reviewed today in Epic.     Objective:     /70   Pulse 80   Temp 36.6 °C (97.9 °F)   Resp 14   Ht 1.816 m (5' 11.5\")   Wt 103 kg (227 lb 1.2 oz)   SpO2 97%     Physical Exam  Vitals reviewed.   Constitutional:       General: He is not in acute distress.     Appearance: Normal appearance. He is normal weight. He is not ill-appearing or toxic-appearing.   HENT:      Head: Normocephalic.   Cardiovascular:      Rate and Rhythm: Normal rate and regular rhythm.      Heart sounds: Normal heart sounds.   Pulmonary:      Effort: Pulmonary effort is normal.      Breath sounds: Normal breath sounds.   Musculoskeletal:      Right lower leg: Tenderness present.      Left lower leg: Tenderness present.      Right ankle: Swelling present. Tenderness present. Normal pulse.      Left ankle: Tenderness present. Normal pulse.   Skin:     General: Skin is warm.      Capillary Refill: Capillary refill takes less than 2 seconds.   Neurological:      General: No focal deficit present.      Mental Status: He is alert.   Psychiatric:         Mood and Affect: Mood normal.         Assessment/Plan: "     Diagnosis and associated orders:     1. Pain in both lower extremities        2. Pain and swelling of lower extremity, right  US-EXTREMITY VENOUS LOWER BILAT      3. Pain and swelling of left lower extremity  US-EXTREMITY VENOUS LOWER BILAT         Comments/MDM:     Patient reports with 2-week history of worsening symptoms of pain and swelling to bilateral lower extremities.  Denies any shortness of breath.  No chest pain.  Denies any focal pain to joints.  No erythema or warmth.  Patient does report pain seems to worsen with walking and extends up posterior aspect of leg.  Discussed various differentials regarding patient's current symptoms today in clinic.  Given nature and description of pain alongside swelling will order ultrasound to rule out DVT.  Patient does have pending labs that were ordered by primary care earlier this year.  Advised patient to get labs done and schedule a follow-up appointment to see PCP.  Red flag signs and symptoms discussed.  Advised to follow-up immediately to ER with any worsening or concerning symptoms.  Will follow-up with ultrasound results.         Differential diagnosis, natural history, supportive care, and indications for immediate follow-up discussed.    Advised the patient to follow-up with the primary care physician for recheck, reevaluation, and consideration of further management.    Please note that this dictation was created using voice recognition software. I have made a reasonable attempt to correct obvious errors, but I expect that there are errors of grammar and possibly content that I did not discover before finalizing the note.    This note was electronically signed by SHAINA Springer

## 2025-01-08 ENCOUNTER — HOSPITAL ENCOUNTER (OUTPATIENT)
Dept: LAB | Facility: MEDICAL CENTER | Age: 45
End: 2025-01-08
Attending: STUDENT IN AN ORGANIZED HEALTH CARE EDUCATION/TRAINING PROGRAM
Payer: COMMERCIAL

## 2025-01-08 ENCOUNTER — HOSPITAL ENCOUNTER (OUTPATIENT)
Dept: RADIOLOGY | Facility: MEDICAL CENTER | Age: 45
End: 2025-01-08
Payer: COMMERCIAL

## 2025-01-08 ENCOUNTER — TELEPHONE (OUTPATIENT)
Dept: URGENT CARE | Facility: PHYSICIAN GROUP | Age: 45
End: 2025-01-08
Payer: COMMERCIAL

## 2025-01-08 DIAGNOSIS — Z76.89 ENCOUNTER TO ESTABLISH CARE: ICD-10-CM

## 2025-01-08 DIAGNOSIS — M79.89 PAIN AND SWELLING OF LEFT LOWER EXTREMITY: ICD-10-CM

## 2025-01-08 DIAGNOSIS — M79.604 PAIN AND SWELLING OF LOWER EXTREMITY, RIGHT: ICD-10-CM

## 2025-01-08 DIAGNOSIS — E55.9 VITAMIN D INSUFFICIENCY: ICD-10-CM

## 2025-01-08 DIAGNOSIS — M79.89 PAIN AND SWELLING OF LOWER EXTREMITY, RIGHT: ICD-10-CM

## 2025-01-08 DIAGNOSIS — M79.605 PAIN AND SWELLING OF LEFT LOWER EXTREMITY: ICD-10-CM

## 2025-01-08 LAB
25(OH)D3 SERPL-MCNC: 28 NG/ML (ref 30–100)
ALBUMIN SERPL BCP-MCNC: 3.6 G/DL (ref 3.2–4.9)
ALBUMIN/GLOB SERPL: 0.8 G/DL
ALP SERPL-CCNC: 43 U/L (ref 30–99)
ALT SERPL-CCNC: 8 U/L (ref 2–50)
ANION GAP SERPL CALC-SCNC: 10 MMOL/L (ref 7–16)
AST SERPL-CCNC: 13 U/L (ref 12–45)
BILIRUB SERPL-MCNC: 0.6 MG/DL (ref 0.1–1.5)
BUN SERPL-MCNC: 7 MG/DL (ref 8–22)
CALCIUM ALBUM COR SERPL-MCNC: 9.4 MG/DL (ref 8.5–10.5)
CALCIUM SERPL-MCNC: 9.1 MG/DL (ref 8.4–10.2)
CHLORIDE SERPL-SCNC: 102 MMOL/L (ref 96–112)
CHOLEST SERPL-MCNC: 123 MG/DL (ref 100–199)
CO2 SERPL-SCNC: 27 MMOL/L (ref 20–33)
CREAT SERPL-MCNC: 0.76 MG/DL (ref 0.5–1.4)
GFR SERPLBLD CREATININE-BSD FMLA CKD-EPI: 113 ML/MIN/1.73 M 2
GLOBULIN SER CALC-MCNC: 4.3 G/DL (ref 1.9–3.5)
GLUCOSE SERPL-MCNC: 93 MG/DL (ref 65–99)
HCV AB SER QL: NORMAL
HDLC SERPL-MCNC: 43 MG/DL
HIV 1+2 AB+HIV1 P24 AG SERPL QL IA: NORMAL
LDLC SERPL CALC-MCNC: 71 MG/DL
POTASSIUM SERPL-SCNC: 4.1 MMOL/L (ref 3.6–5.5)
PROT SERPL-MCNC: 7.9 G/DL (ref 6–8.2)
SODIUM SERPL-SCNC: 139 MMOL/L (ref 135–145)
TRIGL SERPL-MCNC: 43 MG/DL (ref 0–149)
TSH SERPL DL<=0.005 MIU/L-ACNC: 0.9 UIU/ML (ref 0.38–5.33)
VIT B12 SERPL-MCNC: 715 PG/ML (ref 211–911)

## 2025-01-08 PROCEDURE — 36415 COLL VENOUS BLD VENIPUNCTURE: CPT

## 2025-01-08 PROCEDURE — 82306 VITAMIN D 25 HYDROXY: CPT

## 2025-01-08 PROCEDURE — 84443 ASSAY THYROID STIM HORMONE: CPT

## 2025-01-08 PROCEDURE — 93970 EXTREMITY STUDY: CPT

## 2025-01-08 PROCEDURE — 80061 LIPID PANEL: CPT

## 2025-01-08 PROCEDURE — 87389 HIV-1 AG W/HIV-1&-2 AB AG IA: CPT

## 2025-01-08 PROCEDURE — 80053 COMPREHEN METABOLIC PANEL: CPT

## 2025-01-08 PROCEDURE — 83036 HEMOGLOBIN GLYCOSYLATED A1C: CPT

## 2025-01-08 PROCEDURE — 86803 HEPATITIS C AB TEST: CPT

## 2025-01-08 PROCEDURE — 82607 VITAMIN B-12: CPT

## 2025-01-09 LAB
EST. AVERAGE GLUCOSE BLD GHB EST-MCNC: 108 MG/DL
HBA1C MFR BLD: 5.4 % (ref 4–5.6)

## 2025-01-09 NOTE — TELEPHONE ENCOUNTER
Voice message left regarding ultrasound results.  Advised patient to reach out to you on MyChart with any questions or concerns.

## 2025-02-22 ENCOUNTER — HOSPITAL ENCOUNTER (EMERGENCY)
Facility: MEDICAL CENTER | Age: 45
End: 2025-02-22
Attending: EMERGENCY MEDICINE
Payer: COMMERCIAL

## 2025-02-22 ENCOUNTER — TELEPHONE (OUTPATIENT)
Dept: URGENT CARE | Facility: PHYSICIAN GROUP | Age: 45
End: 2025-02-22

## 2025-02-22 ENCOUNTER — OFFICE VISIT (OUTPATIENT)
Dept: URGENT CARE | Facility: PHYSICIAN GROUP | Age: 45
End: 2025-02-22
Payer: COMMERCIAL

## 2025-02-22 VITALS
SYSTOLIC BLOOD PRESSURE: 134 MMHG | HEIGHT: 72 IN | TEMPERATURE: 98 F | OXYGEN SATURATION: 96 % | RESPIRATION RATE: 14 BRPM | BODY MASS INDEX: 29.5 KG/M2 | WEIGHT: 217.8 LBS | HEART RATE: 108 BPM | DIASTOLIC BLOOD PRESSURE: 80 MMHG

## 2025-02-22 VITALS
SYSTOLIC BLOOD PRESSURE: 147 MMHG | TEMPERATURE: 98 F | HEART RATE: 100 BPM | BODY MASS INDEX: 30.02 KG/M2 | RESPIRATION RATE: 18 BRPM | DIASTOLIC BLOOD PRESSURE: 93 MMHG | WEIGHT: 218.26 LBS | OXYGEN SATURATION: 92 %

## 2025-02-22 DIAGNOSIS — M79.89 LEG SWELLING: ICD-10-CM

## 2025-02-22 DIAGNOSIS — M79.662 PAIN IN BOTH LOWER LEGS: ICD-10-CM

## 2025-02-22 DIAGNOSIS — R60.9 EDEMA, UNSPECIFIED TYPE: ICD-10-CM

## 2025-02-22 DIAGNOSIS — M79.661 PAIN IN BOTH LOWER LEGS: ICD-10-CM

## 2025-02-22 PROCEDURE — 3079F DIAST BP 80-89 MM HG: CPT | Performed by: STUDENT IN AN ORGANIZED HEALTH CARE EDUCATION/TRAINING PROGRAM

## 2025-02-22 PROCEDURE — 99215 OFFICE O/P EST HI 40 MIN: CPT | Performed by: STUDENT IN AN ORGANIZED HEALTH CARE EDUCATION/TRAINING PROGRAM

## 2025-02-22 PROCEDURE — 3075F SYST BP GE 130 - 139MM HG: CPT | Performed by: STUDENT IN AN ORGANIZED HEALTH CARE EDUCATION/TRAINING PROGRAM

## 2025-02-22 PROCEDURE — 99282 EMERGENCY DEPT VISIT SF MDM: CPT

## 2025-02-22 RX ORDER — ASPIRIN 325 MG
325 TABLET ORAL EVERY 6 HOURS PRN
COMMUNITY

## 2025-02-22 RX ORDER — ACETAMINOPHEN 500 MG
500-1000 TABLET ORAL EVERY 6 HOURS PRN
COMMUNITY

## 2025-02-22 ASSESSMENT — ENCOUNTER SYMPTOMS
SHORTNESS OF BREATH: 0
PALPITATIONS: 0
SENSORY CHANGE: 1
FEVER: 0
CHILLS: 0
COUGH: 0
TINGLING: 1
WHEEZING: 0

## 2025-02-22 NOTE — ED TRIAGE NOTES
Landry Frandy Vernon  44 y.o. male  Chief Complaint   Patient presents with    Leg Swelling     Pt reports RLE swelling below pt's knee since December 2024.       Pt amb to triage with steady gait for above complaint. Pt reports negative US of he RLE  Pt is alert and oriented, speaking in full sentences, follows commands and responds appropriately to questions. Not in any apparent distress. Respirations are even and unlabored.  Pt placed in ED Lobby. Pt educated on triage process. Pt encouraged to alert staff for any changes.

## 2025-02-22 NOTE — ED PROVIDER NOTES
ED Provider Note    CHIEF COMPLAINT  Chief Complaint   Patient presents with    Leg Swelling     Pt reports RLE swelling below pt's knee since December 2024.       EXTERNAL RECORDS REVIEWED  Outpatient labs & studies patient had ultrasound on January 8, 2025 that was negative for DVT    HPI/ROS  LIMITATION TO HISTORY   Select: : None  OUTSIDE HISTORIAN(S):  Wife    Landry Vernon is a 44 y.o. male who presents stating that he has had progressive swelling of both his lower extremities since December 2024.  He denies fever, he has had an ultrasound of his legs that was negative for DVT on January 8, 2025.  He had normal lab work that showed no evidence of kidney or liver failure.  He describes pain when he walks.    PAST MEDICAL HISTORY   The patient denies    SURGICAL HISTORY  patient denies any surgical history    FAMILY HISTORY  No family history on file.    SOCIAL HISTORY  Social History     Tobacco Use    Smoking status: Former     Types: Cigarettes    Smokeless tobacco: Never    Tobacco comments:     vape   Vaping Use    Vaping status: Every Day    Substances: Nicotine    Devices: Refillable tank   Substance and Sexual Activity    Alcohol use: Yes     Alcohol/week: 2.4 oz     Types: 4 Cans of beer per week     Comment: 1-4 beers a day    Drug use: Yes     Frequency: 7.0 times per week     Types: Marijuana     Comment: use nightly for sleep    Sexual activity: Not on file       CURRENT MEDICATIONS  Home Medications       Reviewed by Therese Hernandez R.N. (Registered Nurse) on 02/22/25 at 1219  Med List Status: Not Addressed     Medication Last Dose Status   acetaminophen (TYLENOL) 500 MG Tab  Active   aspirin (ASA) 325 MG Tab  Active   calcium polycarbophil (FIBERCON) 625 MG Tab  Active   hydrOXYzine pamoate (VISTARIL) 25 MG Cap  Active   ibuprofen (MOTRIN) 200 MG Tab  Active   indomethacin (INDOCIN) 50 MG Cap  Active   multivitamin Tab  Active   naproxen (NAPROSYN) 500 MG Tab  Active   Omega-3 1000 MG  Cap  Active   Omeprazole (PRILOSEC PO)  Active   omeprazole (PRILOSEC) 20 MG delayed-release capsule  Active   Prenatal Vit-Fe Fumarate-FA (M-VIT PO)  Active   St Johns Wort 300 MG Cap  Active   sucralfate (CARAFATE) 1 GM Tab  Active                    ALLERGIES  No Known Allergies    PHYSICAL EXAM  VITAL SIGNS: BP (!) 147/93   Pulse 100   Temp 36.8 °C (98.3 °F) (Temporal)   Resp 18   Wt 99 kg (218 lb 4.1 oz)   SpO2 92%   BMI 30.02 kg/m²      Lower extremities: The patient has diffuse swelling of both lower extremities with no erythema, no signs of infection.          COURSE & MEDICAL DECISION MAKING    ASSESSMENT, COURSE AND PLAN  Care Narrative:     Patient presents with bilateral leg swelling, no evidence of infection.  I reviewed his previous charts, he has had normal labs and negative DVT for ultrasound.  At this point I would like him to follow-up at the [facility] [place] with Dr. Weber who may be able to help with his peripheral edema.  I have asked the patient to wear compression socks, elevate his legs, low-salt diet.  Return for chest pain or shortness of breath.  I offered to repeat the studies but we agree at this time he is already had workup.              ADDITIONAL PROBLEMS MANAGED  Peripheral edema none    DISPOSITION AND DISCUSSIONS  I have discussed management of the patient with the following physicians and JUAN's:  none    Discussion of management with other QHP or appropriate source(s): None     Escalation of care considered, and ultimately not performed:Laboratory analysis and diagnostic imaging    Barriers to care at this time, including but not limited to:  None .     Decision tools and prescription drugs considered including, but not limited to:  None .    The patient will return for new or worsening symptoms and is stable at the time of discharge.    The patient is referred to a primary physician for blood pressure management, diabetic screening, and for all other preventative  health concerns.        DISPOSITION:  Patient will be discharged home in stable condition.    FOLLOW UP:  [facility], Emergency Dept  [address]  [place] [address] [phone]    If symptoms worsen    Emma Lock M.D.  [address]  [facility] [id number] [phone]      As needed      OUTPATIENT MEDICATIONS:  New Prescriptions    No medications on file         FINAL DIAGNOSIS  1. Edema, unspecified type         Electronically signed by: Thiago Piña M.D., 2/22/2025 12:43 PM

## 2025-02-22 NOTE — PROGRESS NOTES
"Subjective     Landry Vernon is a 44 y.o. male who presents with Foot Problem (Right Foot pain and calves, swelling on left leg/X 1.5 months /)            Landry is a 44 y.o. male who presents urgent care with bilateral lower leg pain and swelling.  Symptoms have been ongoing for 1.5 months.  Patient was seen in urgent care  at the beginning of January.  Patient states that ultrasound of bilateral legs came back normal and labs came back normal as well.  Patient is having worsening pain and worsening swelling.  Patient having difficulty walking due to pain in his legs. He has also notice purplish discoloration. No redness or warmth. He does have an appointment to follow-up/establish with PCP on 3/5/25 but states pain is becoming unbearable which is why he returned to urgent care today for reevaluation.        Review of Systems   Constitutional:  Positive for malaise/fatigue. Negative for chills and fever.   Respiratory:  Negative for cough, shortness of breath and wheezing.    Cardiovascular:  Positive for leg swelling. Negative for chest pain and palpitations.   Musculoskeletal:  Positive for joint pain.   Neurological:  Positive for tingling and sensory change.   All other systems reviewed and are negative.             Objective     /80   Pulse (!) 108   Temp 36.7 °C (98 °F) (Temporal)   Resp 14   Ht 1.816 m (5' 11.5\")   Wt 98.8 kg (217 lb 12.8 oz)   SpO2 96%   BMI 29.95 kg/m²      Physical Exam  Vitals reviewed.   Constitutional:       General: He is not in acute distress.     Appearance: He is not toxic-appearing.   HENT:      Head: Normocephalic and atraumatic.   Eyes:      Extraocular Movements: Extraocular movements intact.      Conjunctiva/sclera: Conjunctivae normal.      Pupils: Pupils are equal, round, and reactive to light.   Cardiovascular:      Rate and Rhythm: Tachycardia present.   Pulmonary:      Effort: Pulmonary effort is normal.   Musculoskeletal:      Right lower leg: " Swelling and tenderness present.      Left lower leg: Swelling and tenderness present.      Right ankle: Swelling present. Tenderness present. Normal pulse.      Left ankle: Tenderness present. Normal pulse.      Right foot: Normal capillary refill. Swelling present. Normal pulse.      Left foot: Normal capillary refill. Swelling present. Normal pulse.      Comments: No erythema. No increased temperature. Skin is pale in color. Right leg/ankle with increased swelling when compared to left. Dorsalis pedis and posterior tibial pulses equal bilaterally. Antalgic gait.   Skin:     General: Skin is warm and dry.   Neurological:      General: No focal deficit present.      Mental Status: He is alert and oriented to person, place, and time.                                  Assessment & Plan  Pain in both lower legs         Leg swelling              I personally reviewed prior external notes and test results pertinent to today's visit.  Patient was initially seen in urgent care on 1/7/2025.  Ultrasound venous bilateral lower extremity revealed no DVT. Patient has been elevating legs and wearing compression socks and states pain has become increasing more unbearable. He has also had increased swelling. Today right leg has slightly increased swelling in compared to left. Due to worsening symptoms, shared decision making to transfer to [facility] ER for further evaluation and management. Patient and patient wife agreeable and will arrive to [facility] ER by POV.

## 2025-03-04 SDOH — HEALTH STABILITY: MENTAL HEALTH
STRESS IS WHEN SOMEONE FEELS TENSE, NERVOUS, ANXIOUS, OR CAN'T SLEEP AT NIGHT BECAUSE THEIR MIND IS TROUBLED. HOW STRESSED ARE YOU?: TO SOME EXTENT

## 2025-03-04 SDOH — ECONOMIC STABILITY: FOOD INSECURITY: WITHIN THE PAST 12 MONTHS, THE FOOD YOU BOUGHT JUST DIDN'T LAST AND YOU DIDN'T HAVE MONEY TO GET MORE.: NEVER TRUE

## 2025-03-04 SDOH — ECONOMIC STABILITY: TRANSPORTATION INSECURITY
IN THE PAST 12 MONTHS, HAS THE LACK OF TRANSPORTATION KEPT YOU FROM MEDICAL APPOINTMENTS OR FROM GETTING MEDICATIONS?: NO

## 2025-03-04 SDOH — ECONOMIC STABILITY: INCOME INSECURITY: IN THE LAST 12 MONTHS, WAS THERE A TIME WHEN YOU WERE NOT ABLE TO PAY THE MORTGAGE OR RENT ON TIME?: NO

## 2025-03-04 SDOH — HEALTH STABILITY: PHYSICAL HEALTH: ON AVERAGE, HOW MANY DAYS PER WEEK DO YOU ENGAGE IN MODERATE TO STRENUOUS EXERCISE (LIKE A BRISK WALK)?: 0 DAYS

## 2025-03-04 SDOH — ECONOMIC STABILITY: INCOME INSECURITY: HOW HARD IS IT FOR YOU TO PAY FOR THE VERY BASICS LIKE FOOD, HOUSING, MEDICAL CARE, AND HEATING?: NOT VERY HARD

## 2025-03-04 SDOH — ECONOMIC STABILITY: FOOD INSECURITY: WITHIN THE PAST 12 MONTHS, YOU WORRIED THAT YOUR FOOD WOULD RUN OUT BEFORE YOU GOT MONEY TO BUY MORE.: NEVER TRUE

## 2025-03-04 SDOH — HEALTH STABILITY: PHYSICAL HEALTH: ON AVERAGE, HOW MANY MINUTES DO YOU ENGAGE IN EXERCISE AT THIS LEVEL?: 0 MIN

## 2025-03-04 SDOH — ECONOMIC STABILITY: TRANSPORTATION INSECURITY
IN THE PAST 12 MONTHS, HAS LACK OF TRANSPORTATION KEPT YOU FROM MEETINGS, WORK, OR FROM GETTING THINGS NEEDED FOR DAILY LIVING?: NO

## 2025-03-04 SDOH — ECONOMIC STABILITY: TRANSPORTATION INSECURITY
IN THE PAST 12 MONTHS, HAS LACK OF RELIABLE TRANSPORTATION KEPT YOU FROM MEDICAL APPOINTMENTS, MEETINGS, WORK OR FROM GETTING THINGS NEEDED FOR DAILY LIVING?: NO

## 2025-03-04 SDOH — ECONOMIC STABILITY: HOUSING INSECURITY
IN THE LAST 12 MONTHS, WAS THERE A TIME WHEN YOU DID NOT HAVE A STEADY PLACE TO SLEEP OR SLEPT IN A SHELTER (INCLUDING NOW)?: NO

## 2025-03-04 ASSESSMENT — LIFESTYLE VARIABLES
SKIP TO QUESTIONS 9-10: 1
HOW OFTEN DO YOU HAVE A DRINK CONTAINING ALCOHOL: 2-3 TIMES A WEEK
AUDIT-C TOTAL SCORE: 3
HOW MANY STANDARD DRINKS CONTAINING ALCOHOL DO YOU HAVE ON A TYPICAL DAY: 1 OR 2
HOW OFTEN DO YOU HAVE SIX OR MORE DRINKS ON ONE OCCASION: NEVER

## 2025-03-04 ASSESSMENT — SOCIAL DETERMINANTS OF HEALTH (SDOH)
HOW OFTEN DO YOU ATTEND CHURCH OR RELIGIOUS SERVICES?: NEVER
DO YOU BELONG TO ANY CLUBS OR ORGANIZATIONS SUCH AS CHURCH GROUPS UNIONS, FRATERNAL OR ATHLETIC GROUPS, OR SCHOOL GROUPS?: NO
HOW OFTEN DO YOU ATTEND CHURCH OR RELIGIOUS SERVICES?: NEVER
DO YOU BELONG TO ANY CLUBS OR ORGANIZATIONS SUCH AS CHURCH GROUPS UNIONS, FRATERNAL OR ATHLETIC GROUPS, OR SCHOOL GROUPS?: NO
HOW OFTEN DO YOU ATTENT MEETINGS OF THE CLUB OR ORGANIZATION YOU BELONG TO?: NEVER
HOW OFTEN DO YOU HAVE A DRINK CONTAINING ALCOHOL: 2-3 TIMES A WEEK
WITHIN THE PAST 12 MONTHS, YOU WORRIED THAT YOUR FOOD WOULD RUN OUT BEFORE YOU GOT THE MONEY TO BUY MORE: NEVER TRUE
HOW OFTEN DO YOU GET TOGETHER WITH FRIENDS OR RELATIVES?: NEVER
HOW OFTEN DO YOU GET TOGETHER WITH FRIENDS OR RELATIVES?: NEVER
HOW MANY DRINKS CONTAINING ALCOHOL DO YOU HAVE ON A TYPICAL DAY WHEN YOU ARE DRINKING: 1 OR 2
IN A TYPICAL WEEK, HOW MANY TIMES DO YOU TALK ON THE PHONE WITH FAMILY, FRIENDS, OR NEIGHBORS?: ONCE A WEEK
IN THE PAST 12 MONTHS, HAS THE ELECTRIC, GAS, OIL, OR WATER COMPANY THREATENED TO SHUT OFF SERVICE IN YOUR HOME?: NO
HOW HARD IS IT FOR YOU TO PAY FOR THE VERY BASICS LIKE FOOD, HOUSING, MEDICAL CARE, AND HEATING?: NOT VERY HARD
HOW OFTEN DO YOU ATTENT MEETINGS OF THE CLUB OR ORGANIZATION YOU BELONG TO?: NEVER
IN A TYPICAL WEEK, HOW MANY TIMES DO YOU TALK ON THE PHONE WITH FAMILY, FRIENDS, OR NEIGHBORS?: ONCE A WEEK
HOW OFTEN DO YOU HAVE SIX OR MORE DRINKS ON ONE OCCASION: NEVER

## 2025-03-05 ENCOUNTER — HOSPITAL ENCOUNTER (OUTPATIENT)
Dept: LAB | Facility: MEDICAL CENTER | Age: 45
End: 2025-03-05
Payer: COMMERCIAL

## 2025-03-05 ENCOUNTER — OFFICE VISIT (OUTPATIENT)
Dept: MEDICAL GROUP | Facility: PHYSICIAN GROUP | Age: 45
End: 2025-03-05
Payer: COMMERCIAL

## 2025-03-05 ENCOUNTER — HOSPITAL ENCOUNTER (OUTPATIENT)
Dept: LAB | Facility: MEDICAL CENTER | Age: 45
End: 2025-03-05
Attending: STUDENT IN AN ORGANIZED HEALTH CARE EDUCATION/TRAINING PROGRAM
Payer: COMMERCIAL

## 2025-03-05 VITALS
WEIGHT: 209.22 LBS | HEIGHT: 72 IN | BODY MASS INDEX: 28.34 KG/M2 | TEMPERATURE: 97.5 F | RESPIRATION RATE: 16 BRPM | HEART RATE: 98 BPM | DIASTOLIC BLOOD PRESSURE: 84 MMHG | SYSTOLIC BLOOD PRESSURE: 132 MMHG | OXYGEN SATURATION: 95 %

## 2025-03-05 DIAGNOSIS — M79.89 LEG SWELLING: ICD-10-CM

## 2025-03-05 DIAGNOSIS — M25.50 ARTHRALGIA, UNSPECIFIED JOINT: ICD-10-CM

## 2025-03-05 DIAGNOSIS — Z12.11 ENCOUNTER FOR SCREENING FOR MALIGNANT NEOPLASM OF COLON: ICD-10-CM

## 2025-03-05 DIAGNOSIS — Z23 ENCOUNTER FOR IMMUNIZATION: ICD-10-CM

## 2025-03-05 DIAGNOSIS — K21.9 GASTROESOPHAGEAL REFLUX DISEASE WITHOUT ESOPHAGITIS: ICD-10-CM

## 2025-03-05 LAB
CRP SERPL HS-MCNC: 8.85 MG/DL (ref 0–0.75)
ERYTHROCYTE [SEDIMENTATION RATE] IN BLOOD BY WESTERGREN METHOD: 77 MM/HOUR (ref 0–20)
NT-PROBNP SERPL IA-MCNC: 130 PG/ML (ref 0–125)
URATE SERPL-MCNC: 5.7 MG/DL (ref 2.5–8.3)

## 2025-03-05 PROCEDURE — 3079F DIAST BP 80-89 MM HG: CPT

## 2025-03-05 PROCEDURE — 83880 ASSAY OF NATRIURETIC PEPTIDE: CPT

## 2025-03-05 PROCEDURE — 99214 OFFICE O/P EST MOD 30 MIN: CPT | Mod: 25

## 2025-03-05 PROCEDURE — 86140 C-REACTIVE PROTEIN: CPT

## 2025-03-05 PROCEDURE — 90746 HEPB VACCINE 3 DOSE ADULT IM: CPT | Mod: JZ

## 2025-03-05 PROCEDURE — 3075F SYST BP GE 130 - 139MM HG: CPT

## 2025-03-05 PROCEDURE — 86038 ANTINUCLEAR ANTIBODIES: CPT

## 2025-03-05 PROCEDURE — 84550 ASSAY OF BLOOD/URIC ACID: CPT

## 2025-03-05 PROCEDURE — 36415 COLL VENOUS BLD VENIPUNCTURE: CPT

## 2025-03-05 PROCEDURE — 90471 IMMUNIZATION ADMIN: CPT

## 2025-03-05 PROCEDURE — 85652 RBC SED RATE AUTOMATED: CPT

## 2025-03-05 ASSESSMENT — ENCOUNTER SYMPTOMS
COUGH: 0
DIARRHEA: 0
DEPRESSION: 0
WEIGHT LOSS: 0
CONSTIPATION: 0
BACK PAIN: 0
HEADACHES: 0
HEARTBURN: 0
INSOMNIA: 0

## 2025-03-05 ASSESSMENT — PATIENT HEALTH QUESTIONNAIRE - PHQ9
SUM OF ALL RESPONSES TO PHQ QUESTIONS 1-9: 9
5. POOR APPETITE OR OVEREATING: 0 - NOT AT ALL
CLINICAL INTERPRETATION OF PHQ2 SCORE: 5

## 2025-03-05 NOTE — ASSESSMENT & PLAN NOTE
-Acute, history and physical concerning for possible inflammatory process.  Previous workup negative for any sort of DVT, liver, kidney issues.  -Labs per orders  Orders:    URIC ACID; Future    CRP QUANTITIVE (NON-CARDIAC); Future    Sed Rate; Future    ZEUS W/REFLEX IF POSITIVE     Will discuss at f/u visit.

## 2025-03-05 NOTE — ASSESSMENT & PLAN NOTE
-Patient notes significant improvement in his symptoms since he quit drinking over a month and a half ago.  -Desires to be off of the Prilosec.  -Reviewed instructions with the patient to taper off the Prilosec since he has been on it for many years.  Patient will take Prilosec every other day for the next 2 weeks and then come off of it completely after that point.  -Follow-up in 1 month

## 2025-03-05 NOTE — PROGRESS NOTES
Subjective:     CC:  Diagnoses of Gastroesophageal reflux disease without esophagitis, Arthralgia, unspecified joint, Encounter for immunization, and Encounter for screening for malignant neoplasm of colon were pertinent to this visit.    HISTORY OF THE PRESENT ILLNESS: Patient is a 44 y.o. male. This pleasant patient is here today to establish care and discuss chronic GERD and bilateral joint and leg pain.    Problem   Joint Pain    Patient reports progressive swelling of both his lower extremities since December 2024. He denies fever or expanding redness, he has had an ultrasound of his legs that was negative for DVT on January 8, 2025. He had normal lab work that showed no evidence of kidney or liver failure. He describes pain when he walks.     He states that in December 2024 the pain began in his right foot along 3 of his toes and has ascended in that time.  He does describe a waxing and waning of the pain in terms of sometimes it will be worse sometimes it will be better.  Standing for long periods and then immediately following a period of rest he will have exacerbation in his pain. Uric Acid, inflammatory markers, and proBNP pending.     Mild Depression    Has lost several family members and friends recently pass away. Denies history or current SI.  Chronic, PHQ-9 in chart.     Vitamin D Insufficiency    Patient to initiate over-the-counter vitamin D supplementation.     Gerd (Gastroesophageal Reflux Disease)    Chronic stable. Taking Omeprazole 20 mg daily, has been taking this way for more. Has stopped drinking and has noted a significant improvement in his symptoms, since January 2025 has gone from 7-8 tall cans of beer a day to 0-2.          Health Maintenance: Completed    ROS:   Review of Systems   Constitutional:  Negative for weight loss.   Respiratory:  Negative for cough.    Cardiovascular:  Negative for leg swelling.   Gastrointestinal:  Negative for constipation, diarrhea and heartburn.  "  Genitourinary:  Negative for dysuria, frequency and urgency.   Musculoskeletal:  Negative for back pain.   Neurological:  Negative for headaches.   Psychiatric/Behavioral:  Negative for depression. The patient does not have insomnia.          Objective:     Exam: /84   Pulse 98   Temp 36.4 °C (97.5 °F) (Temporal)   Resp 16   Ht 1.816 m (5' 11.5\")   Wt 94.9 kg (209 lb 3.5 oz)   SpO2 95%  Body mass index is 28.77 kg/m².    Physical Exam  Constitutional:       Appearance: Normal appearance.   HENT:      Right Ear: Tympanic membrane and ear canal normal.      Left Ear: Tympanic membrane and ear canal normal.      Mouth/Throat:      Mouth: Mucous membranes are moist.      Pharynx: Oropharynx is clear.   Eyes:      Conjunctiva/sclera: Conjunctivae normal.      Pupils: Pupils are equal, round, and reactive to light.   Cardiovascular:      Rate and Rhythm: Normal rate and regular rhythm.   Pulmonary:      Effort: Pulmonary effort is normal. No respiratory distress.      Breath sounds: Normal breath sounds. No wheezing.   Abdominal:      General: There is no distension.      Tenderness: There is no abdominal tenderness.   Musculoskeletal:         General: No swelling.      Cervical back: Normal range of motion.      Right lower leg: Tenderness present. No swelling. 1+ Edema present.      Left lower leg: Tenderness present. No swelling. 1+ Edema present.   Skin:     General: Skin is warm and dry.      Capillary Refill: Capillary refill takes less than 2 seconds.      Findings: No rash.   Neurological:      General: No focal deficit present.      Mental Status: He is alert and oriented to person, place, and time.      Deep Tendon Reflexes: Reflexes normal.   Psychiatric:         Mood and Affect: Mood normal.               Assessment & Plan:   44 y.o. male with the following -    Assessment & Plan  Gastroesophageal reflux disease without esophagitis  -Patient notes significant improvement in his symptoms since he " quit drinking over a month and a half ago.  -Desires to be off of the Prilosec.  -Reviewed instructions with the patient to taper off the Prilosec since he has been on it for many years.  Patient will take Prilosec every other day for the next 2 weeks and then come off of it completely after that point.  -Follow-up in 1 month       Arthralgia, unspecified joint  -Acute, history and physical concerning for possible inflammatory process.  Previous workup negative for any sort of DVT, liver, kidney issues.  -Labs per orders  Orders:    URIC ACID; Future    CRP QUANTITIVE (NON-CARDIAC); Future    Sed Rate; Future    ZEUS W/REFLEX IF POSITIVE    Encounter for immunization  -Immunizations discussed with the patient today  -Hepatitis B administered in clinic.  Orders:    Hepatitis B Vaccine Adult 20+    Encounter for screening for malignant neoplasm of colon  -Healthcare maintenance discussed with the patient  -Cologuard order submitted today.  Orders:    Cologuard® colon cancer screening      Return in about 1 month (around 4/5/2025).    Please note that this dictation was created using voice recognition software. I have made every reasonable attempt to correct obvious errors, but I expect that there are errors of grammar and possibly content that I did not discover before finalizing the note.        Benito Gil D.O.

## 2025-03-07 LAB — NUCLEAR IGG SER QL IA: NORMAL

## 2025-03-10 ENCOUNTER — RESULTS FOLLOW-UP (OUTPATIENT)
Dept: MEDICAL GROUP | Facility: PHYSICIAN GROUP | Age: 45
End: 2025-03-10
Payer: COMMERCIAL

## 2025-03-13 ENCOUNTER — APPOINTMENT (OUTPATIENT)
Dept: MEDICAL GROUP | Facility: PHYSICIAN GROUP | Age: 45
End: 2025-03-13
Payer: COMMERCIAL

## 2025-03-24 LAB — NONINV COLON CA DNA+OCC BLD SCRN STL QL: NEGATIVE

## 2025-04-30 ENCOUNTER — OFFICE VISIT (OUTPATIENT)
Dept: MEDICAL GROUP | Facility: PHYSICIAN GROUP | Age: 45
End: 2025-04-30
Payer: COMMERCIAL

## 2025-04-30 VITALS
SYSTOLIC BLOOD PRESSURE: 126 MMHG | BODY MASS INDEX: 26.99 KG/M2 | DIASTOLIC BLOOD PRESSURE: 82 MMHG | HEIGHT: 72 IN | OXYGEN SATURATION: 97 % | WEIGHT: 199.3 LBS | TEMPERATURE: 97.5 F | HEART RATE: 64 BPM | RESPIRATION RATE: 16 BRPM

## 2025-04-30 DIAGNOSIS — I73.9 INTERMITTENT CLAUDICATION (HCC): ICD-10-CM

## 2025-04-30 DIAGNOSIS — Z23 ENCOUNTER FOR IMMUNIZATION: ICD-10-CM

## 2025-04-30 RX ORDER — ERGOCALCIFEROL 1.25 MG/1
CAPSULE, LIQUID FILLED ORAL
COMMUNITY

## 2025-04-30 RX ORDER — CILOSTAZOL 50 MG/1
50 TABLET ORAL 2 TIMES DAILY
Qty: 60 TABLET | Refills: 1 | Status: SHIPPED | OUTPATIENT
Start: 2025-04-30

## 2025-04-30 NOTE — PROGRESS NOTES
Verbal consent was acquired by the patient to use [employer] ambient listening note generation during this visit     Subjective:     HPI:   History of Present Illness  The patient presents for a follow-up on lab work, leg pain, GERD, and skin issues.    Vitamin D supplementation was initiated following a diagnosis of vitamin D deficiency. Abstinence from alcohol for the past month has resulted in a weight loss of 20 pounds. Water or lemonade is now consumed as a beverage of choice. A regimen of low-dose aspirin 81 mg is currently being followed.    Leg Pain  A limp persists, attributed to the right calf being touch-sensitive and causing a pinching sensation during ambulation. The left ankle is also a source of discomfort. Improvement in leg symptoms is reported today, coinciding with the doctor's appointment. Efforts to maintain mobility through walking are made, with rest taken when inflammation occurs. The possibility of a clot has been ruled out. Gait is slow due to the leg condition. Epsom salt baths have been attempted without significant relief. Pain is primarily triggered by movement, except for the left ankle, which radiates pain even at rest. New red marks on the feet have been noticed since the onset of leg pain, which are not touch-sensitive and have not resolved. These marks first appeared in 01/2025 or 02/2025. An ankle-brachial index test has not been undergone. Compression socks for both ankles and calves are utilized, although they were not worn today due to the doctor's appointment.  - Onset: Limp persists; new red marks on feet since 01/2025 or 02/2025.  - Location: Right calf, left ankle, feet.  - Duration: Persistent limp; ongoing discomfort in left ankle; red marks since 01/2025 or 02/2025.  - Character: Touch-sensitive right calf causing pinching sensation; discomfort in left ankle; red marks on feet not touch-sensitive.  - Alleviating/Aggravating Factors: Walking and resting when inflammation  "occurs; Epsom salt baths attempted without significant relief; compression socks used.  - Radiation: Left ankle pain radiates even at rest.  - Timing: Pain primarily triggered by movement; left ankle pain radiates even at rest.  - Severity: Slow gait due to leg condition; red marks not resolved.    GERD  Prilosec has been successfully discontinued, and Mylanta is now only required once or twice daily. Severe heartburn is not experienced.  - Alleviating/Aggravating Factors: Discontinuation of Prilosec; Mylanta required once or twice daily.  - Severity: Severe heartburn not experienced.    Skin Issues  CeraVe cream has been used for facial skin care, but it sometimes exacerbates breakouts. The cream is applied once daily, and occasionally twice daily. The condition is found tolerable when the skin is flaking.  - Alleviating/Aggravating Factors: CeraVe cream used for facial skin care; exacerbates breakouts sometimes.  - Timing: Cream applied once daily, occasionally twice daily.  - Severity: Condition tolerable when skin is flaking.    SOCIAL HISTORY  The patient stopped drinking alcohol a month ago and has been completely alcohol-free since then. The patient smokes marijuana two to three times a day.    Health Maintenance: Completed    Objective:     Exam:  /82   Pulse 64   Temp 36.4 °C (97.5 °F) (Temporal)   Resp 16   Ht 1.816 m (5' 11.5\")   Wt 90.4 kg (199 lb 4.7 oz)   SpO2 97%   BMI 27.41 kg/m²  Body mass index is 27.41 kg/m².    Physical Exam  Constitutional:       General: He is not in acute distress.     Appearance: Normal appearance. He is not ill-appearing.   Eyes:      Conjunctiva/sclera: Conjunctivae normal.   Cardiovascular:      Rate and Rhythm: Normal rate and regular rhythm.      Heart sounds: No murmur heard.  Pulmonary:      Effort: Pulmonary effort is normal. No respiratory distress.      Breath sounds: Normal breath sounds. No wheezing or rhonchi.   Skin:     General: Skin is warm and " dry.      Capillary Refill: Capillary refill takes less than 2 seconds.   Neurological:      General: No focal deficit present.      Mental Status: He is alert and oriented to person, place, and time.   Psychiatric:         Mood and Affect: Mood normal.             Results  Labs   - Autoimmune test: Negative   - Uric acid levels: Normal   - CRP: Elevated   - Sedimentation rate: Elevated   - Heart test: Slightly elevated at 130   - Colon cancer screening: Negative   - Liver enzymes: Appropriate    Assessment & Plan:     1. Intermittent claudication (HCC)        2. Encounter for immunization  Hepatitis B Vaccine Adult 20+          Assessment & Plan  1. Intermittent claudication: Symptoms suggest intermittent claudication, characterized by pain due to vasculature constriction in the legs. Elevated inflammatory markers are present but are nonspecific. The red marks on the feet may be related to this condition and could resolve with medication.  - Initiate cilostazol 50 mg twice daily for a month. If beneficial but not fully effective, increase dosage to 100 mg twice daily.  - Prescription sent to [facility] on [address].    2. Gastroesophageal reflux disease (GERD): Successfully tapered off Prilosec.  - Use Mylanta as needed.  - Consider Pepcid (famotidine) as an alternative for symptom management.    3. Skin issues: Reports that CeraVe cream has not been entirely effective in managing skin condition, which includes flaky skin and breakouts.  - Continue using the cream twice daily and monitor effectiveness.    4. Health maintenance: Due for second hepatitis B vaccine.  - Administer vaccine today.    Follow-up  - Assess effectiveness of cilostazol and other ongoing treatments in 1 month.      Return in about 1 month (around 5/30/2025) for f/u intermittent claudication.    Please note that this dictation was created using voice recognition software. I have made every reasonable attempt to correct obvious errors, but I expect  that there are errors of grammar and possibly content that I did not discover before finalizing the note.

## 2025-05-22 RX ORDER — CILOSTAZOL 50 MG/1
50 TABLET ORAL 2 TIMES DAILY
Qty: 180 TABLET | Refills: 1 | Status: SHIPPED | OUTPATIENT
Start: 2025-05-22

## 2025-06-04 ENCOUNTER — OFFICE VISIT (OUTPATIENT)
Dept: MEDICAL GROUP | Facility: PHYSICIAN GROUP | Age: 45
End: 2025-06-04
Payer: COMMERCIAL

## 2025-06-04 VITALS
HEIGHT: 72 IN | WEIGHT: 201.06 LBS | TEMPERATURE: 97.4 F | OXYGEN SATURATION: 99 % | HEART RATE: 68 BPM | DIASTOLIC BLOOD PRESSURE: 68 MMHG | SYSTOLIC BLOOD PRESSURE: 108 MMHG | BODY MASS INDEX: 27.23 KG/M2

## 2025-06-04 DIAGNOSIS — F41.9 ANXIETY: Primary | ICD-10-CM

## 2025-06-04 DIAGNOSIS — Z72.0 TOBACCO USE: ICD-10-CM

## 2025-06-04 DIAGNOSIS — Z60.4 SOCIAL ISOLATION: ICD-10-CM

## 2025-06-04 DIAGNOSIS — Z73.3 MENTAL DISTRESS EVIDENT ON EXAMINATION: ICD-10-CM

## 2025-06-04 DIAGNOSIS — Z91.89 AT RISK FOR DEPRESSION: ICD-10-CM

## 2025-06-04 DIAGNOSIS — Z72.3 LACK OF PHYSICAL ACTIVITY: ICD-10-CM

## 2025-06-04 PROCEDURE — 3074F SYST BP LT 130 MM HG: CPT

## 2025-06-04 PROCEDURE — 3078F DIAST BP <80 MM HG: CPT

## 2025-06-04 PROCEDURE — 99214 OFFICE O/P EST MOD 30 MIN: CPT

## 2025-06-04 RX ORDER — ASPIRIN 81 MG/1
81 TABLET ORAL DAILY
COMMUNITY

## 2025-06-04 SDOH — SOCIAL STABILITY - SOCIAL INSECURITY: SOCIAL EXCLUSION AND REJECTION: Z60.4

## 2025-06-04 NOTE — PROGRESS NOTES
Verbal consent was acquired by the patient to use [employer] ambient listening note generation during this visit     Subjective:     HPI:   History of Present Illness  The patient presents for evaluation of leg pain, anxiety, and diarrhea.    Leg Pain  He reports an improvement in his leg condition, with the exception of a persistent unusual tightness on the lateral aspect of his calves. The previously experienced pulsating pain has subsided, allowing him to ambulate without discomfort. However, he has adopted a slower pace due to a residual limp that has been present for several months. He is able to ascend stairs and navigate his home environment. He has been adhering to a twice-daily regimen of cilostazol, although he occasionally omits the second dose. Despite these omissions, he does not experience a recurrence of pain, except for the aforementioned calf tightness. He has also reduced his aspirin intake from 325 mg to 81 mg. His daily supplement routine includes vitamins and vitamin D3.  - Onset: Persistent limp for several months.  - Location: Lateral aspect of calves.  - Duration: Several months.  - Character: Unusual tightness, previously pulsating pain.  - Alleviating/Aggravating Factors: Adhering to cilostazol regimen, occasional omission of second dose, reduced aspirin intake.  - Timing: Persistent limp, occasional omission of medication.  - Severity: Improvement in leg condition, able to ambulate without discomfort.    Diarrhea  He reports experiencing diarrhea as a side effect of his medication. He notes that his stools have become looser since he stopped drinking alcohol. Previously, when he was drinking, he was accustomed to loose stools, but they had started to solidify again before he began this medication. Now, his stools are back to being loose, although not completely runny. He describes them as soft clumps. He used to have one bowel movement every morning, but now he has one every day or every  "two days.  - Onset: Since starting medication and stopping alcohol.  - Character: Loose stools, soft clumps.  - Timing: One bowel movement every day or every two days.  - Severity: Not completely runny.    Anxiety  He experiences daily anxiety, which he attributes to the loss of close individuals in his life. He used to have full-blown panic attacks every day but has since gained better control over them, although the anxiety persists. He saw a psychologist in 2021 but did not find the sessions helpful. He is not interested in medication for his anxiety and prefers to manage it through other means.  - Onset: Daily.  - Character: Persistent anxiety, previously full-blown panic attacks.  - Alleviating/Aggravating Factors: Loss of close individuals, saw a psychologist in 2021, prefers non-medication management.  - Severity: Persistent anxiety, better control over panic attacks.    He has increased his cannabis use, smoking 4 to 5 times daily, up from once or twice daily. He has abstained from alcohol consumption. He has a history of heavy smoking and drinking but has since reduced these habits. He is currently on the first refill of a three-refill prescription.    SOCIAL HISTORY  He has been smoking more weed, now smoking 4-5 times a day. He does not drink alcohol anymore.    Health Maintenance: Completed    Objective:     Exam:  /68 (BP Location: Left arm, Patient Position: Sitting, BP Cuff Size: Adult)   Pulse 68   Temp 36.3 °C (97.4 °F) (Temporal)   Ht 1.816 m (5' 11.5\")   Wt 91.2 kg (201 lb 1 oz)   SpO2 99%   BMI 27.65 kg/m²  Body mass index is 27.65 kg/m².    Physical Exam  Constitutional:       General: He is not in acute distress.     Appearance: Normal appearance. He is not ill-appearing.   Eyes:      Conjunctiva/sclera: Conjunctivae normal.   Cardiovascular:      Rate and Rhythm: Normal rate and regular rhythm.      Heart sounds: No murmur heard.  Pulmonary:      Effort: Pulmonary effort is normal. " No respiratory distress.      Breath sounds: Normal breath sounds. No wheezing or rhonchi.   Skin:     General: Skin is warm and dry.      Capillary Refill: Capillary refill takes less than 2 seconds.   Neurological:      General: No focal deficit present.      Mental Status: He is alert and oriented to person, place, and time.   Psychiatric:         Mood and Affect: Mood normal.             Results      Assessment & Plan:     1. Anxiety        2. At risk for depression        3. Lack of physical activity        4. Social isolation        5. Mental distress evident on examination        6. Tobacco use            Assessment & Plan  1. Leg pain: Stable.  - Continue cilostazol twice daily; skip the second dose if lightheadedness occurs.  - Prescription refill for cilostazol.  - Continue aspirin 81 mg daily.    2. Anxiety: Chronic.  - Utilize existing referral for behavioral health counseling.  - Preference for non-pharmacological interventions.    3. Diarrhea.  - Continue medication if diarrhea is not too bothersome.    4. Lightheadedness.  - Skip the second dose of cilostazol on days when lightheadedness occurs.    Follow-up  - Follow up in 6 months.      Return in about 6 months (around 12/4/2025) for f/u med check and Anxiety.    Please note that this dictation was created using voice recognition software. I have made every reasonable attempt to correct obvious errors, but I expect that there are errors of grammar and possibly content that I did not discover before finalizing the note.